# Patient Record
Sex: MALE | Race: BLACK OR AFRICAN AMERICAN | Employment: OTHER | URBAN - METROPOLITAN AREA
[De-identification: names, ages, dates, MRNs, and addresses within clinical notes are randomized per-mention and may not be internally consistent; named-entity substitution may affect disease eponyms.]

---

## 2017-03-27 ENCOUNTER — OFFICE VISIT (OUTPATIENT)
Dept: INTERNAL MEDICINE | Age: 67
End: 2017-03-27

## 2017-03-27 VITALS
RESPIRATION RATE: 18 BRPM | SYSTOLIC BLOOD PRESSURE: 132 MMHG | DIASTOLIC BLOOD PRESSURE: 78 MMHG | HEART RATE: 68 BPM | WEIGHT: 149 LBS | BODY MASS INDEX: 23.51 KG/M2

## 2017-03-27 DIAGNOSIS — J43.1 PANLOBULAR EMPHYSEMA (HCC): Primary | ICD-10-CM

## 2017-03-27 DIAGNOSIS — I10 ESSENTIAL HYPERTENSION, BENIGN: ICD-10-CM

## 2017-03-27 DIAGNOSIS — R06.02 SOB (SHORTNESS OF BREATH): ICD-10-CM

## 2017-03-27 LAB
A/G RATIO: 1.4 (ref 1.1–2.2)
ALBUMIN SERPL-MCNC: 4 G/DL (ref 3.4–5)
ALP BLD-CCNC: 58 U/L (ref 40–129)
ALT SERPL-CCNC: 9 U/L (ref 10–40)
ANION GAP SERPL CALCULATED.3IONS-SCNC: 11 MMOL/L (ref 3–16)
AST SERPL-CCNC: 15 U/L (ref 15–37)
BILIRUB SERPL-MCNC: <0.2 MG/DL (ref 0–1)
BUN BLDV-MCNC: 20 MG/DL (ref 7–20)
CALCIUM SERPL-MCNC: 10 MG/DL (ref 8.3–10.6)
CHLORIDE BLD-SCNC: 102 MMOL/L (ref 99–110)
CO2: 29 MMOL/L (ref 21–32)
CREAT SERPL-MCNC: 1.5 MG/DL (ref 0.8–1.3)
GFR AFRICAN AMERICAN: 56
GFR NON-AFRICAN AMERICAN: 47
GLOBULIN: 2.8 G/DL
GLUCOSE BLD-MCNC: 90 MG/DL (ref 70–99)
HCT VFR BLD CALC: 37.6 % (ref 40.5–52.5)
HEMOGLOBIN: 12.1 G/DL (ref 13.5–17.5)
MCH RBC QN AUTO: 28.5 PG (ref 26–34)
MCHC RBC AUTO-ENTMCNC: 32.3 G/DL (ref 31–36)
MCV RBC AUTO: 88.4 FL (ref 80–100)
PDW BLD-RTO: 15.6 % (ref 12.4–15.4)
PLATELET # BLD: 220 K/UL (ref 135–450)
PMV BLD AUTO: 8.4 FL (ref 5–10.5)
POTASSIUM SERPL-SCNC: 4.7 MMOL/L (ref 3.5–5.1)
RBC # BLD: 4.25 M/UL (ref 4.2–5.9)
SODIUM BLD-SCNC: 142 MMOL/L (ref 136–145)
TOTAL PROTEIN: 6.8 G/DL (ref 6.4–8.2)
WBC # BLD: 3 K/UL (ref 4–11)

## 2017-03-27 PROCEDURE — 99213 OFFICE O/P EST LOW 20 MIN: CPT | Performed by: INTERNAL MEDICINE

## 2017-03-27 PROCEDURE — 36415 COLL VENOUS BLD VENIPUNCTURE: CPT | Performed by: INTERNAL MEDICINE

## 2017-03-27 ASSESSMENT — ENCOUNTER SYMPTOMS
ALLERGIC/IMMUNOLOGIC NEGATIVE: 1
GASTROINTESTINAL NEGATIVE: 1
SHORTNESS OF BREATH: 1
EYES NEGATIVE: 1
COUGH: 0

## 2017-03-27 ASSESSMENT — COPD QUESTIONNAIRES: COPD: 1

## 2017-09-05 ENCOUNTER — TELEPHONE (OUTPATIENT)
Dept: INTERNAL MEDICINE | Age: 67
End: 2017-09-05

## 2017-09-18 ENCOUNTER — OFFICE VISIT (OUTPATIENT)
Dept: INTERNAL MEDICINE | Age: 67
End: 2017-09-18

## 2017-09-18 VITALS
RESPIRATION RATE: 16 BRPM | HEART RATE: 56 BPM | DIASTOLIC BLOOD PRESSURE: 90 MMHG | WEIGHT: 147 LBS | BODY MASS INDEX: 23.2 KG/M2 | SYSTOLIC BLOOD PRESSURE: 140 MMHG

## 2017-09-18 DIAGNOSIS — N18.2 CRI (CHRONIC RENAL INSUFFICIENCY), STAGE 2 (MILD): ICD-10-CM

## 2017-09-18 DIAGNOSIS — J43.1 PANLOBULAR EMPHYSEMA (HCC): Primary | ICD-10-CM

## 2017-09-18 DIAGNOSIS — I10 ESSENTIAL HYPERTENSION, BENIGN: ICD-10-CM

## 2017-09-18 LAB
A/G RATIO: 1.6 (ref 1.1–2.2)
ALBUMIN SERPL-MCNC: 4.2 G/DL (ref 3.4–5)
ALP BLD-CCNC: 45 U/L (ref 40–129)
ALT SERPL-CCNC: 9 U/L (ref 10–40)
ANION GAP SERPL CALCULATED.3IONS-SCNC: 10 MMOL/L (ref 3–16)
AST SERPL-CCNC: 14 U/L (ref 15–37)
BILIRUB SERPL-MCNC: <0.2 MG/DL (ref 0–1)
BUN BLDV-MCNC: 26 MG/DL (ref 7–20)
CALCIUM SERPL-MCNC: 9.5 MG/DL (ref 8.3–10.6)
CHLORIDE BLD-SCNC: 102 MMOL/L (ref 99–110)
CO2: 30 MMOL/L (ref 21–32)
CREAT SERPL-MCNC: 1.6 MG/DL (ref 0.8–1.3)
GFR AFRICAN AMERICAN: 52
GFR NON-AFRICAN AMERICAN: 43
GLOBULIN: 2.6 G/DL
GLUCOSE BLD-MCNC: 95 MG/DL (ref 70–99)
POTASSIUM SERPL-SCNC: 4.1 MMOL/L (ref 3.5–5.1)
SODIUM BLD-SCNC: 142 MMOL/L (ref 136–145)
TOTAL PROTEIN: 6.8 G/DL (ref 6.4–8.2)

## 2017-09-18 PROCEDURE — 36415 COLL VENOUS BLD VENIPUNCTURE: CPT | Performed by: INTERNAL MEDICINE

## 2017-09-18 PROCEDURE — 99214 OFFICE O/P EST MOD 30 MIN: CPT | Performed by: INTERNAL MEDICINE

## 2017-09-18 ASSESSMENT — COPD QUESTIONNAIRES: COPD: 1

## 2017-09-18 ASSESSMENT — ENCOUNTER SYMPTOMS
SHORTNESS OF BREATH: 1
ALLERGIC/IMMUNOLOGIC NEGATIVE: 1
COUGH: 0
GASTROINTESTINAL NEGATIVE: 1
EYES NEGATIVE: 1

## 2017-10-30 ENCOUNTER — OFFICE VISIT (OUTPATIENT)
Dept: INTERNAL MEDICINE | Age: 67
End: 2017-10-30

## 2017-10-30 VITALS
RESPIRATION RATE: 16 BRPM | DIASTOLIC BLOOD PRESSURE: 86 MMHG | HEIGHT: 67 IN | WEIGHT: 144 LBS | SYSTOLIC BLOOD PRESSURE: 126 MMHG | HEART RATE: 80 BPM | BODY MASS INDEX: 22.6 KG/M2

## 2017-10-30 DIAGNOSIS — Z23 NEEDS FLU SHOT: ICD-10-CM

## 2017-10-30 DIAGNOSIS — R06.02 SOB (SHORTNESS OF BREATH): ICD-10-CM

## 2017-10-30 DIAGNOSIS — J43.1 PANLOBULAR EMPHYSEMA (HCC): Primary | ICD-10-CM

## 2017-10-30 DIAGNOSIS — Z87.891 HISTORY OF TOBACCO USE: ICD-10-CM

## 2017-10-30 DIAGNOSIS — I10 ESSENTIAL HYPERTENSION, BENIGN: ICD-10-CM

## 2017-10-30 PROCEDURE — 4040F PNEUMOC VAC/ADMIN/RCVD: CPT | Performed by: INTERNAL MEDICINE

## 2017-10-30 PROCEDURE — G8420 CALC BMI NORM PARAMETERS: HCPCS | Performed by: INTERNAL MEDICINE

## 2017-10-30 PROCEDURE — 99214 OFFICE O/P EST MOD 30 MIN: CPT | Performed by: INTERNAL MEDICINE

## 2017-10-30 PROCEDURE — G0008 ADMIN INFLUENZA VIRUS VAC: HCPCS | Performed by: INTERNAL MEDICINE

## 2017-10-30 PROCEDURE — 1036F TOBACCO NON-USER: CPT | Performed by: INTERNAL MEDICINE

## 2017-10-30 PROCEDURE — G8926 SPIRO NO PERF OR DOC: HCPCS | Performed by: INTERNAL MEDICINE

## 2017-10-30 PROCEDURE — G8484 FLU IMMUNIZE NO ADMIN: HCPCS | Performed by: INTERNAL MEDICINE

## 2017-10-30 PROCEDURE — 3017F COLORECTAL CA SCREEN DOC REV: CPT | Performed by: INTERNAL MEDICINE

## 2017-10-30 PROCEDURE — 90662 IIV NO PRSV INCREASED AG IM: CPT | Performed by: INTERNAL MEDICINE

## 2017-10-30 PROCEDURE — 1123F ACP DISCUSS/DSCN MKR DOCD: CPT | Performed by: INTERNAL MEDICINE

## 2017-10-30 PROCEDURE — 3023F SPIROM DOC REV: CPT | Performed by: INTERNAL MEDICINE

## 2017-10-30 PROCEDURE — G8427 DOCREV CUR MEDS BY ELIG CLIN: HCPCS | Performed by: INTERNAL MEDICINE

## 2017-10-30 NOTE — PROGRESS NOTES
Vaccine Information Sheet, \"Influenza - Inactivated\"  given to Francisco Bertrand, or parent/legal guardian of  Francisco Bertrand and verbalized understanding. Patient responses:    Have you ever had a reaction to a flu vaccine? No  Are you able to eat eggs without adverse effects? Yes  Do you have any current illness? No  Have you ever had Guillian [place] Syndrome? No    Flu vaccine given per order. Please see immunization tab.
Authorizing Provider   fluticasone-salmeterol (ADVAIR DISKUS) 500-50 MCG/DOSE diskus inhaler Inhale 1 puff into the lungs every 12 hours 17  Yes Benjamin Molina MD   fluticasone-salmeterol (ADVAIR DISKUS) 250-50 MCG/DOSE AEPB Inhale 1 puff into the lungs 2 times daily   Yes Historical Provider, MD   tiotropium (SPIRIVA HANDIHALER) 18 MCG inhalation capsule Inhale 1 capsule into the lungs daily 3/27/17  Yes Benjamin Molina MD   NIFEDICAL XL 30 MG extended release tablet TAKE 1 TABLET BY MOUTH DAILY 17  Yes Benjamin Molina MD   tadalafil (CIALIS) 5 MG tablet Take 1 tablet by mouth as needed for Erectile Dysfunction 16  Yes Benjamin Molina MD   Multiple Vitamins-Minerals (THERAPEUTIC MULTIVITAMIN-MINERALS) tablet Take 1 tablet by mouth daily   Yes Historical Provider, MD   albuterol sulfate HFA (VENTOLIN HFA) 108 (90 BASE) MCG/ACT inhaler Inhale 2 puffs into the lungs every 6 hours as needed for Wheezing 16   Benjamin Molina MD       Past Medical History:   Diagnosis Date    Asthma     Hyperlipidemia        Past Surgical History:   Procedure Laterality Date    CARDIAC CATHETERIZATION  2013    Dr. Siena Matute      Right inguinal hernia repair    UPPER GASTROINTESTINAL ENDOSCOPY  2017    Dr. Little Score, haital hernia, mid gastric stricture.         Social History   Substance Use Topics    Smoking status: Former Smoker     Packs/day: 0.50     Years: 45.00     Quit date: 10/21/2015    Smokeless tobacco: Never Used    Alcohol use No       Family History   Problem Relation Age of Onset    Other Mother      80years old-healthy    Other Father       young-heart attack    Other Brother      Healthy 61year old   [facility] Other Sister      61year old-unknown medical history    Other Sister      Healthy 62year old

## 2017-10-30 NOTE — PATIENT INSTRUCTIONS
instructions on the label. Do not give aspirin to anyone younger than 20. It has been linked to Reye syndrome, a serious illness. · If your child is under age 2 or weighs less than 24 pounds, follow your doctor's advice about the amount of medicine to give your child. · Put ice or a cold pack on the sore area for 10 to 20 minutes at a time. Put a thin cloth between the ice and your skin. When should you call for help? Call 911 anytime you think you may need emergency care. For example, call if:  · You or your child has severe problems breathing or swallowing. · You or your child has a seizure. Call your doctor now or seek immediate medical care if:  · You or your child gets hives. · You or your child has a high fever. · You or your child gets a rash. · You or your child has an unusual reaction after the shot. Watch closely for changes in your or your child's health, and be sure to contact your doctor if you have any problems. Where can you learn more? Go to https://[employer]Peer[employer]. org and sign in to your [employer] account. Enter M581 in the [employer] box to learn more about \"Varicella Vaccine: Care Instructions. \"     If you do not have an account, please click on the \"Sign Up Now\" link. Current as of: November 10, 2016  Content Version: 11.3  © 8888-4644 [employer]. Care instructions adapted under license by [facility] ([facility]). If you have questions about a medical condition or this instruction, always ask your healthcare professional. [place] [address] any warranty or liability for your use of this information. Patient Education        Endovascular Aortic Aneurysm Repair: What to Expect at Home  Your Recovery  Endovascular aortic aneurysm repair is a procedure to fix a weak and bulging section of the aorta. The aorta is the large blood vessel (artery) that carries blood from the heart through the belly to the rest of the body.  The doctor put a after your surgery. This is common. Try to avoid constipation and straining with bowel movements. You may want to take a fiber supplement every day. If you have not had a bowel movement after a couple of days, ask your doctor about taking a mild laxative. Medicines  · Your doctor will tell you if and when you can restart your medicines. He or she will also give you instructions about taking any new medicines. · If you take blood thinners, such as warfarin (Coumadin), clopidogrel (Plavix), or aspirin, be sure to talk to your doctor. He or she will tell you if and when to start taking those medicines again. Make sure that you understand exactly what your doctor wants you to do. · Be safe with medicines. Take pain medicines exactly as directed. ¨ If the doctor gave you a prescription medicine for pain, take it as prescribed. ¨ If you are not taking a prescription pain medicine, ask your doctor if you can take an over-the-counter medicine. ¨ Do not take two or more pain medicines at the same time unless the doctor told you to. Many pain medicines have acetaminophen, which is Tylenol. Too much acetaminophen (Tylenol) can be harmful. · If you think your pain medicine is making you sick to your stomach:  ¨ Take your medicine after meals (unless your doctor has told you not to). ¨ Ask your doctor for a different pain medicine. · If your doctor prescribed antibiotics, take them as directed. Do not stop taking them just because you feel better. You need to take the full course of antibiotics. Incision care  · If you have strips of tape on the incisions, leave the tape on for a week or until it falls off. · Wash the area daily with water and pat it dry. Other cleaning products, such as hydrogen peroxide, can make the wounds heal more slowly. You may cover the area with a gauze bandage if it weeps or rubs against clothing. Change the bandage every day. · Keep the area clean and dry.   Follow-up care is a key part of

## 2017-12-06 ENCOUNTER — TELEPHONE (OUTPATIENT)
Dept: INTERNAL MEDICINE CLINIC | Age: 67
End: 2017-12-06

## 2017-12-06 RX ORDER — NIFEDIPINE 30 MG/1
TABLET, EXTENDED RELEASE ORAL
Qty: 90 TABLET | Refills: 3 | Status: SHIPPED | OUTPATIENT
Start: 2017-12-06 | End: 2018-08-20 | Stop reason: SDUPTHER

## 2017-12-06 NOTE — TELEPHONE ENCOUNTER
Pt needs refill of NIFEDICAL XL 30 MG extended release tablet [[id number]      [facility] [address] [place] [address], [address] [place] [address] - F [phone]

## 2018-05-14 ENCOUNTER — OFFICE VISIT (OUTPATIENT)
Dept: INTERNAL MEDICINE CLINIC | Age: 68
End: 2018-05-14

## 2018-05-14 VITALS
BODY MASS INDEX: 22.5 KG/M2 | OXYGEN SATURATION: 92 % | WEIGHT: 142.6 LBS | DIASTOLIC BLOOD PRESSURE: 97 MMHG | SYSTOLIC BLOOD PRESSURE: 151 MMHG | HEART RATE: 75 BPM

## 2018-05-14 DIAGNOSIS — J43.1 PANLOBULAR EMPHYSEMA (HCC): ICD-10-CM

## 2018-05-14 DIAGNOSIS — I10 ESSENTIAL HYPERTENSION, BENIGN: Primary | ICD-10-CM

## 2018-05-14 DIAGNOSIS — N18.30 STAGE 3 CHRONIC KIDNEY DISEASE (HCC): ICD-10-CM

## 2018-05-14 LAB
CREATININE URINE: 169.7 MG/DL (ref 39–259)
MICROALBUMIN UR-MCNC: <1.2 MG/DL
MICROALBUMIN/CREAT UR-RTO: NORMAL MG/G (ref 0–30)

## 2018-05-14 PROCEDURE — G8510 SCR DEP NEG, NO PLAN REQD: HCPCS | Performed by: INTERNAL MEDICINE

## 2018-05-14 PROCEDURE — G8428 CUR MEDS NOT DOCUMENT: HCPCS | Performed by: INTERNAL MEDICINE

## 2018-05-14 PROCEDURE — G8926 SPIRO NO PERF OR DOC: HCPCS | Performed by: INTERNAL MEDICINE

## 2018-05-14 PROCEDURE — G8420 CALC BMI NORM PARAMETERS: HCPCS | Performed by: INTERNAL MEDICINE

## 2018-05-14 PROCEDURE — 4040F PNEUMOC VAC/ADMIN/RCVD: CPT | Performed by: INTERNAL MEDICINE

## 2018-05-14 PROCEDURE — 99214 OFFICE O/P EST MOD 30 MIN: CPT | Performed by: INTERNAL MEDICINE

## 2018-05-14 PROCEDURE — 1036F TOBACCO NON-USER: CPT | Performed by: INTERNAL MEDICINE

## 2018-05-14 PROCEDURE — 3023F SPIROM DOC REV: CPT | Performed by: INTERNAL MEDICINE

## 2018-05-14 PROCEDURE — 3017F COLORECTAL CA SCREEN DOC REV: CPT | Performed by: INTERNAL MEDICINE

## 2018-05-14 PROCEDURE — 1123F ACP DISCUSS/DSCN MKR DOCD: CPT | Performed by: INTERNAL MEDICINE

## 2018-05-14 PROCEDURE — 3288F FALL RISK ASSESSMENT DOCD: CPT | Performed by: INTERNAL MEDICINE

## 2018-05-14 RX ORDER — AMLODIPINE BESYLATE 5 MG/1
5 TABLET ORAL DAILY
Qty: 30 TABLET | Refills: 2 | Status: SHIPPED | OUTPATIENT
Start: 2018-05-14 | End: 2018-05-14 | Stop reason: SDUPTHER

## 2018-05-14 RX ORDER — SOFT LENS DISINFECTANT
1 SOLUTION, NON-ORAL MISCELLANEOUS 4 TIMES DAILY PRN
Qty: 1 DEVICE | Refills: 0 | Status: SHIPPED | OUTPATIENT
Start: 2018-05-14

## 2018-05-14 RX ORDER — IPRATROPIUM BROMIDE AND ALBUTEROL SULFATE 2.5; .5 MG/3ML; MG/3ML
1 SOLUTION RESPIRATORY (INHALATION) EVERY 6 HOURS PRN
Qty: 360 ML | Refills: 1 | Status: SHIPPED | OUTPATIENT
Start: 2018-05-14 | End: 2019-06-17 | Stop reason: ALTCHOICE

## 2018-05-14 RX ORDER — BUDESONIDE AND FORMOTEROL FUMARATE DIHYDRATE 160; 4.5 UG/1; UG/1
2 AEROSOL RESPIRATORY (INHALATION) 2 TIMES DAILY
COMMUNITY
End: 2019-06-17 | Stop reason: SDUPTHER

## 2018-05-14 RX ORDER — BUDESONIDE AND FORMOTEROL FUMARATE DIHYDRATE 160; 4.5 UG/1; UG/1
2 AEROSOL RESPIRATORY (INHALATION) 2 TIMES DAILY
Qty: 1 INHALER | Refills: 3 | Status: SHIPPED | OUTPATIENT
Start: 2018-05-14 | End: 2018-08-20 | Stop reason: SDUPTHER

## 2018-05-14 RX ORDER — IPRATROPIUM BROMIDE AND ALBUTEROL SULFATE 2.5; .5 MG/3ML; MG/3ML
1 SOLUTION RESPIRATORY (INHALATION) EVERY 6 HOURS PRN
Qty: 360 ML | Refills: 1 | Status: SHIPPED | OUTPATIENT
Start: 2018-05-14 | End: 2018-05-14 | Stop reason: SDUPTHER

## 2018-05-14 RX ORDER — AMLODIPINE BESYLATE 5 MG/1
5 TABLET ORAL DAILY
Qty: 30 TABLET | Refills: 2 | Status: SHIPPED | OUTPATIENT
Start: 2018-05-14 | End: 2018-08-20 | Stop reason: ALTCHOICE

## 2018-05-14 ASSESSMENT — PATIENT HEALTH QUESTIONNAIRE - PHQ9
SUM OF ALL RESPONSES TO PHQ QUESTIONS 1-9: 0
2. FEELING DOWN, DEPRESSED OR HOPELESS: 0
1. LITTLE INTEREST OR PLEASURE IN DOING THINGS: 0
SUM OF ALL RESPONSES TO PHQ9 QUESTIONS 1 & 2: 0

## 2018-08-14 ENCOUNTER — TELEPHONE (OUTPATIENT)
Dept: ORTHOPEDIC SURGERY | Age: 68
End: 2018-08-14

## 2018-08-20 ENCOUNTER — OFFICE VISIT (OUTPATIENT)
Dept: INTERNAL MEDICINE CLINIC | Age: 68
End: 2018-08-20

## 2018-08-20 VITALS
HEART RATE: 68 BPM | WEIGHT: 141 LBS | SYSTOLIC BLOOD PRESSURE: 158 MMHG | DIASTOLIC BLOOD PRESSURE: 98 MMHG | BODY MASS INDEX: 22.25 KG/M2

## 2018-08-20 DIAGNOSIS — J43.1 PANLOBULAR EMPHYSEMA (HCC): Primary | ICD-10-CM

## 2018-08-20 DIAGNOSIS — I10 ESSENTIAL HYPERTENSION, BENIGN: ICD-10-CM

## 2018-08-20 PROCEDURE — G8420 CALC BMI NORM PARAMETERS: HCPCS | Performed by: INTERNAL MEDICINE

## 2018-08-20 PROCEDURE — 1101F PT FALLS ASSESS-DOCD LE1/YR: CPT | Performed by: INTERNAL MEDICINE

## 2018-08-20 PROCEDURE — 3017F COLORECTAL CA SCREEN DOC REV: CPT | Performed by: INTERNAL MEDICINE

## 2018-08-20 PROCEDURE — G8427 DOCREV CUR MEDS BY ELIG CLIN: HCPCS | Performed by: INTERNAL MEDICINE

## 2018-08-20 PROCEDURE — 3023F SPIROM DOC REV: CPT | Performed by: INTERNAL MEDICINE

## 2018-08-20 PROCEDURE — 4040F PNEUMOC VAC/ADMIN/RCVD: CPT | Performed by: INTERNAL MEDICINE

## 2018-08-20 PROCEDURE — 1036F TOBACCO NON-USER: CPT | Performed by: INTERNAL MEDICINE

## 2018-08-20 PROCEDURE — 1123F ACP DISCUSS/DSCN MKR DOCD: CPT | Performed by: INTERNAL MEDICINE

## 2018-08-20 PROCEDURE — 99213 OFFICE O/P EST LOW 20 MIN: CPT | Performed by: INTERNAL MEDICINE

## 2018-08-20 PROCEDURE — G8926 SPIRO NO PERF OR DOC: HCPCS | Performed by: INTERNAL MEDICINE

## 2018-08-20 RX ORDER — SOFT LENS DISINFECTANT
1 SOLUTION, NON-ORAL MISCELLANEOUS 4 TIMES DAILY PRN
Qty: 1 DEVICE | Refills: 0 | Status: SHIPPED | OUTPATIENT
Start: 2018-08-20 | End: 2018-09-04 | Stop reason: SDUPTHER

## 2018-08-20 RX ORDER — NIFEDIPINE 60 MG/1
60 TABLET, EXTENDED RELEASE ORAL DAILY
Qty: 30 TABLET | Refills: 2 | Status: SHIPPED | OUTPATIENT
Start: 2018-08-20 | End: 2019-02-18 | Stop reason: SDUPTHER

## 2018-08-20 RX ORDER — DOXAZOSIN 2 MG/1
2 TABLET ORAL NIGHTLY
Qty: 30 TABLET | Refills: 2 | Status: SHIPPED | OUTPATIENT
Start: 2018-08-20 | End: 2018-11-26 | Stop reason: SDUPTHER

## 2018-08-20 NOTE — PATIENT INSTRUCTIONS
Patient Education        Chronic Obstructive Pulmonary Disease (COPD): Care Instructions  Your Care Instructions    Chronic obstructive pulmonary disease (COPD) is a general term for a group of lung diseases, including emphysema and chronic bronchitis. People with COPD have decreased airflow in and out of the lungs, which makes it hard to breathe. The airways also can get clogged with thick mucus. Cigarette smoking is a major cause of COPD. Although there is no cure for COPD, you can slow its progress. Following your treatment plan and taking care of yourself can help you feel better and live longer. Follow-up care is a key part of your treatment and safety. Be sure to make and go to all appointments, and call your doctor if you are having problems. It's also a good idea to know your test results and keep a list of the medicines you take. How can you care for yourself at home?   Staying healthy    · Do not smoke. This is the most important step you can take to prevent more damage to your lungs. If you need help quitting, talk to your doctor about stop-smoking programs and medicines. These can increase your chances of quitting for good.     · Avoid colds and flu. Get a pneumococcal vaccine shot. If you have had one before, ask your doctor whether you need a second dose. Get the flu vaccine every fall. If you must be around people with colds or the flu, wash your hands often.     · Avoid secondhand smoke, air pollution, and high altitudes. Also avoid cold, dry air and hot, humid air. Stay at home with your windows closed when air pollution is bad.    Medicines and oxygen therapy    · Take your medicines exactly as prescribed. Call your doctor if you think you are having a problem with your medicine.     · You may be taking medicines such as:  ¨ Bronchodilators. These help open your airways and make breathing easier.  Bronchodilators are either short-acting (work for 6 to 9 hours) or long-acting (work for 25 hours). You inhale most bronchodilators, so they start to act quickly. Always carry your quick-relief inhaler with you in case you need it while you are away from home. ¨ Corticosteroids (prednisone, budesonide). These reduce airway inflammation. They come in pill or inhaled form. You must take these medicines every day for them to work well.     · A spacer may help you get more inhaled medicine to your lungs. Ask your doctor or pharmacist if a spacer is right for you. If it is, ask how to use it properly.     · Do not take any vitamins, over-the-counter medicine, or herbal products without talking to your doctor first.     · If your doctor prescribed antibiotics, take them as directed. Do not stop taking them just because you feel better. You need to take the full course of antibiotics.     · Oxygen therapy boosts the amount of oxygen in your blood and helps you breathe easier. Use the flow rate your doctor has recommended, and do not change it without talking to your doctor first.   Activity    · Get regular exercise. Walking is an easy way to get exercise. Start out slowly, and walk a little more each day.     · Pay attention to your breathing. You are exercising too hard if you cannot talk while you are exercising.     · Take short rest breaks when doing household chores and other activities.     · Learn breathing methods-such as breathing through pursed lips-to help you become less short of breath.     · If your doctor has not set you up with a pulmonary rehabilitation program, talk to him or her about whether rehab is right for you. Rehab includes exercise programs, education about your disease and how to manage it, help with diet and other changes, and emotional support. Diet    · Eat regular, healthy meals. Use bronchodilators about 1 hour before you eat to make it easier to eat. Eat several small meals instead of three large ones. Drink beverages at the end of the meal. Avoid foods that are hard to chew. each day. A serving is 3 ounces, about the size of a deck of cards. · Eat 4 to 5 servings of nuts, seeds, and legumes (cooked dried beans, lentils, and split peas) each week. A serving is 1/3 cup of nuts, 2 tablespoons of seeds, or ½ cup of cooked beans or peas. · Limit fats and oils to 2 to 3 servings each day. A serving is 1 teaspoon of vegetable oil or 2 tablespoons of salad dressing. · Limit sweets and added sugars to 5 servings or less a week. A serving is 1 tablespoon jelly or jam, ½ cup sorbet, or 1 cup of lemonade. · Eat less than 2,300 milligrams (mg) of sodium a day. If you limit your sodium to 1,500 mg a day, you can lower your blood pressure even more. Tips for success  · Start small. Do not try to make dramatic changes to your diet all at once. You might feel that you are missing out on your favorite foods and then be more likely to not follow the plan. Make small changes, and stick with them. Once those changes become habit, add a few more changes. · Try some of the following:  ¨ Make it a goal to eat a fruit or vegetable at every meal and at snacks. This will make it easy to get the recommended amount of fruits and vegetables each day. ¨ Try yogurt topped with fruit and nuts for a snack or healthy dessert. ¨ Add lettuce, tomato, cucumber, and onion to sandwiches. ¨ Combine a ready-made pizza crust with low-fat mozzarella cheese and lots of vegetable toppings. Try using tomatoes, squash, spinach, broccoli, carrots, cauliflower, and onions. ¨ Have a variety of cut-up vegetables with a low-fat dip as an appetizer instead of chips and dip. ¨ Sprinkle sunflower seeds or chopped almonds over salads. Or try adding chopped walnuts or almonds to cooked vegetables. ¨ Try some vegetarian meals using beans and peas. Add garbanzo or kidney beans to salads. Make burritos and tacos with mashed gagnon beans or black beans. Where can you learn more? Go to https://alyssa.[facility]. org and sign

## 2018-08-20 NOTE — PROGRESS NOTES
62.5-25 MCG/INH AEPB inhaler Inhale 1 puff into the lungs daily 18  Yes Rosetta Schilder, DO   Respiratory Therapy Supplies (NEBULIZER) LILIANE 1 applicator by Does not apply route 4 times daily as needed (SOB/wheezing) 18  Yes Rosetta Schilder, DO   ipratropium-albuterol (DUONEB) 0.5-2.5 (3) MG/3ML SOLN nebulizer solution Inhale 3 mLs into the lungs every 6 hours as needed for Shortness of Breath 18  Yes Rosetta Schilder, DO   amLODIPine (NORVASC) 5 MG tablet Take 1 tablet by mouth daily 18  Yes Rosetta Schilder, DO   tadalafil (CIALIS) 5 MG tablet Take 1 tablet by mouth as needed for Erectile Dysfunction 16  Yes Anya Clemons MD   albuterol sulfate HFA (VENTOLIN HFA) 108 (90 BASE) MCG/ACT inhaler Inhale 2 puffs into the lungs every 6 hours as needed for Wheezing 16  Yes Anya Clemons MD   Multiple Vitamins-Minerals (THERAPEUTIC MULTIVITAMIN-MINERALS) tablet Take 1 tablet by mouth daily   Yes Historical Provider, MD   NIFEdipine (NIFEDICAL XL) 30 MG extended release tablet TAKE 1 TABLET BY MOUTH DAILY  Patient taking differently: 60 mg TAKE 1 TABLET BY MOUTH DAILY 17   Rosetta Schilder, DO       Past Medical History:   Diagnosis Date    Asthma     Hyperlipidemia        Past Surgical History:   Procedure Laterality Date    CARDIAC CATHETERIZATION  2013    Dr. Hunter Arellano      Right inguinal hernia repair    UPPER GASTROINTESTINAL ENDOSCOPY  2017    Dr. Margaret Reid, haital hernia, mid gastric stricture.         Social History   Substance Use Topics    Smoking status: Former Smoker     Packs/day: 0.50     Years: 45.00     Quit date: 10/21/2015    Smokeless tobacco: Never Used    Alcohol use No       Family History   Problem Relation Age of Onset    Other Mother         80years old-healthy    Other Father          young-heart attack    Other Brother         Healthy 61year old   Marjorie Dong Other Sister         61year old-unknown medical history    Other Sister

## 2018-08-24 ENCOUNTER — TELEPHONE (OUTPATIENT)
Dept: INTERNAL MEDICINE CLINIC | Age: 68
End: 2018-08-24

## 2018-08-24 NOTE — TELEPHONE ENCOUNTER
Roxanna with [facility] office called to speak to Dr. Birgit Elizabeth or his MA regarding a referral they received. Please call Roxanna at number provided. Thanks.

## 2018-08-28 ENCOUNTER — TELEPHONE (OUTPATIENT)
Dept: INTERNAL MEDICINE CLINIC | Age: 68
End: 2018-08-28

## 2018-09-04 ENCOUNTER — TELEPHONE (OUTPATIENT)
Dept: INTERNAL MEDICINE CLINIC | Age: 68
End: 2018-09-04

## 2018-09-04 RX ORDER — SOFT LENS DISINFECTANT
1 SOLUTION, NON-ORAL MISCELLANEOUS 4 TIMES DAILY PRN
Qty: 1 DEVICE | Refills: 0 | Status: SHIPPED | OUTPATIENT
Start: 2018-09-04 | End: 2019-06-17 | Stop reason: ALTCHOICE

## 2018-09-04 NOTE — TELEPHONE ENCOUNTER
Patient states that he lost the prescription for the nebulizer. Patient requesting to  another script or have it called into [facility]. Please advise. Patient can be reached at phone number provided.      [facility] [address] [phone]

## 2018-10-02 ENCOUNTER — TELEPHONE (OUTPATIENT)
Dept: INTERNAL MEDICINE CLINIC | Age: 68
End: 2018-10-02

## 2018-10-02 RX ORDER — ALBUTEROL SULFATE 90 UG/1
2 AEROSOL, METERED RESPIRATORY (INHALATION) EVERY 6 HOURS PRN
Qty: 1 INHALER | Refills: 5 | Status: SHIPPED | OUTPATIENT
Start: 2018-10-02 | End: 2019-06-17 | Stop reason: SDUPTHER

## 2018-10-15 ENCOUNTER — TELEPHONE (OUTPATIENT)
Dept: INTERNAL MEDICINE CLINIC | Age: 68
End: 2018-10-15

## 2018-10-15 NOTE — TELEPHONE ENCOUNTER
Roxanna stated that they received a referral for this pt. And they have scheduled him 7 times . He has cancelled 5 times and no showed 2 times  She stated that she not sure if pt wants to be seen. She wants to know what they should do? Please advise.

## 2018-11-26 ENCOUNTER — OFFICE VISIT (OUTPATIENT)
Dept: INTERNAL MEDICINE CLINIC | Age: 68
End: 2018-11-26
Payer: MEDICARE

## 2018-11-26 VITALS
DIASTOLIC BLOOD PRESSURE: 94 MMHG | WEIGHT: 140 LBS | HEART RATE: 76 BPM | OXYGEN SATURATION: 92 % | TEMPERATURE: 98.5 F | RESPIRATION RATE: 16 BRPM | SYSTOLIC BLOOD PRESSURE: 144 MMHG | BODY MASS INDEX: 22.09 KG/M2

## 2018-11-26 DIAGNOSIS — I10 ESSENTIAL HYPERTENSION, BENIGN: Primary | ICD-10-CM

## 2018-11-26 DIAGNOSIS — J43.1 PANLOBULAR EMPHYSEMA (HCC): ICD-10-CM

## 2018-11-26 DIAGNOSIS — Z12.11 SCREENING FOR COLON CANCER: ICD-10-CM

## 2018-11-26 DIAGNOSIS — E78.00 HYPERCHOLESTEROLEMIA: ICD-10-CM

## 2018-11-26 DIAGNOSIS — Z72.89 OTHER PROBLEMS RELATED TO LIFESTYLE: ICD-10-CM

## 2018-11-26 DIAGNOSIS — R06.02 SOB (SHORTNESS OF BREATH): ICD-10-CM

## 2018-11-26 PROCEDURE — G8427 DOCREV CUR MEDS BY ELIG CLIN: HCPCS | Performed by: INTERNAL MEDICINE

## 2018-11-26 PROCEDURE — 4040F PNEUMOC VAC/ADMIN/RCVD: CPT | Performed by: INTERNAL MEDICINE

## 2018-11-26 PROCEDURE — 1101F PT FALLS ASSESS-DOCD LE1/YR: CPT | Performed by: INTERNAL MEDICINE

## 2018-11-26 PROCEDURE — 90662 IIV NO PRSV INCREASED AG IM: CPT | Performed by: INTERNAL MEDICINE

## 2018-11-26 PROCEDURE — 99214 OFFICE O/P EST MOD 30 MIN: CPT | Performed by: INTERNAL MEDICINE

## 2018-11-26 PROCEDURE — 3023F SPIROM DOC REV: CPT | Performed by: INTERNAL MEDICINE

## 2018-11-26 PROCEDURE — 1123F ACP DISCUSS/DSCN MKR DOCD: CPT | Performed by: INTERNAL MEDICINE

## 2018-11-26 PROCEDURE — G8926 SPIRO NO PERF OR DOC: HCPCS | Performed by: INTERNAL MEDICINE

## 2018-11-26 PROCEDURE — G8420 CALC BMI NORM PARAMETERS: HCPCS | Performed by: INTERNAL MEDICINE

## 2018-11-26 PROCEDURE — 1036F TOBACCO NON-USER: CPT | Performed by: INTERNAL MEDICINE

## 2018-11-26 PROCEDURE — G8482 FLU IMMUNIZE ORDER/ADMIN: HCPCS | Performed by: INTERNAL MEDICINE

## 2018-11-26 PROCEDURE — 3017F COLORECTAL CA SCREEN DOC REV: CPT | Performed by: INTERNAL MEDICINE

## 2018-11-26 PROCEDURE — G0008 ADMIN INFLUENZA VIRUS VAC: HCPCS | Performed by: INTERNAL MEDICINE

## 2018-11-26 RX ORDER — DOXAZOSIN 2 MG/1
2 TABLET ORAL NIGHTLY
Qty: 30 TABLET | Refills: 2 | Status: SHIPPED | OUTPATIENT
Start: 2018-11-26 | End: 2018-11-26 | Stop reason: SDUPTHER

## 2018-11-26 RX ORDER — GUAIFENESIN 600 MG/1
1200 TABLET, EXTENDED RELEASE ORAL 2 TIMES DAILY
Qty: 60 TABLET | Refills: 0 | Status: SHIPPED | OUTPATIENT
Start: 2018-11-26 | End: 2019-03-19

## 2018-11-26 NOTE — PATIENT INSTRUCTIONS
hours). You inhale most bronchodilators, so they start to act quickly. Always carry your quick-relief inhaler with you in case you need it while you are away from home. ? Corticosteroids (prednisone, budesonide). These reduce airway inflammation. They come in pill or inhaled form. You must take these medicines every day for them to work well.     · A spacer may help you get more inhaled medicine to your lungs. Ask your doctor or pharmacist if a spacer is right for you. If it is, ask how to use it properly.     · Do not take any vitamins, over-the-counter medicine, or herbal products without talking to your doctor first.     · If your doctor prescribed antibiotics, take them as directed. Do not stop taking them just because you feel better. You need to take the full course of antibiotics.     · Oxygen therapy boosts the amount of oxygen in your blood and helps you breathe easier. Use the flow rate your doctor has recommended, and do not change it without talking to your doctor first.   Activity    · Get regular exercise. Walking is an easy way to get exercise. Start out slowly, and walk a little more each day.     · Pay attention to your breathing. You are exercising too hard if you cannot talk while you are exercising.     · Take short rest breaks when doing household chores and other activities.     · Learn breathing methods--such as breathing through pursed lips--to help you become less short of breath.     · If your doctor has not set you up with a pulmonary rehabilitation program, talk to him or her about whether rehab is right for you. Rehab includes exercise programs, education about your disease and how to manage it, help with diet and other changes, and emotional support. Diet    · Eat regular, healthy meals. Use bronchodilators about 1 hour before you eat to make it easier to eat. Eat several small meals instead of three large ones.  Drink beverages at the end of the meal. Avoid foods that are hard to

## 2018-11-26 NOTE — PROGRESS NOTES
PROGRESS NOTE:    Dorian Donaldson    11/28/2018    Chief Complaint   Patient presents with    Follow-up     Exercises on stationaary bike three to four times weekly. HPI:    Mr(s)Regino Donaldson presents to clinic today with issues noted above. States he had a nice Thanksgiving, had his family over and he cooked. No new issues medically. Still have much exertional dyspnea with hx of COPD. He tired a nebulizer tx, thinks may be because he had phlegm. States Anoro is very expensive. Patient is taking BP medications at home without side effects, BP is not being checked at home. Patient denies chest pain, SOB, NVD, FC, rash, malaise, rigor, dizziness/lightheadness, other pertinent ROS was also reviewed. BP (!) 144/94 (Site: Right Upper Arm, Position: Sitting, Cuff Size: Medium Adult)   Pulse 76 Comment: Regular  Temp 98.5 °F (36.9 °C) (Oral)   Resp 16   Wt 140 lb (63.5 kg)   SpO2 92% Comment: Room Air  BMI 22.09 kg/m²   Body mass index is 22.09 kg/m². Allergies   Allergen Reactions    Codeine Hives     Physical Exam:    Gen: Patient appears well groomed, frail appearing  HEAD: Atraumatic, normocephalic,   Eyes: PERRLA, EOMI   Neck: supple, no thyroid nodule appreciated, no JVD  Chest: Clear to auscultation CRISTINO, every extended and poor air movement, unlabored breathing, normal expansion, barrel chest  Heart: Regular rate, regular rhythm, no murmur, no rub, distant quality  Abdomen: Non-tender, non-distended, bowel sounds present x3  Extremities: no edema, distal pulses intact  Patient was alert and oriented to person, place and time    Assessment and Plan:    Lindsay Ren was seen today for follow-up. Diagnoses and all orders for this visit:    Essential hypertension, benign  Controlled overall, continue meds, follow renal function   -     COMPREHENSIVE METABOLIC PANEL; Future  -     LDL CHOLESTEROL, DIRECT;  Future    Panlobular emphysema ([address]  On exam he did not have any conversational dyspnea, has episodes of mucous plugging possibly with hx he provided when using nebulizer, added daily mucinex, daily ICS and prn nebulizer and also Anoro samples given today    SOB (shortness of breath)  As above    Hypercholesterolemia  Patient is tolerating anti-lipid meds such as statin without complications, no myalgia.     -     COMPREHENSIVE METABOLIC PANEL; Future  -     HDL CHOLESTEROL; Future  -     LDL CHOLESTEROL, DIRECT; Future    Screening for colon cancer  FIT test due, not a good candidate for anesthesia for cscope due to respiratory disease    Other orders  -     INFLUENZA, HIGH DOSE, 65 YRS +, IM, PF, PREFILL SYR, 0.5ML (FLUZONE HD)  -     guaiFENesin (MUCINEX) 600 MG extended release tablet; Take 2 tablets by mouth 2 times daily  -     Discontinue: doxazosin (CARDURA) 2 MG tablet; Take 1 tablet by mouth nightly  -     Cancel: POCT FECAL IMMUNOCHEMICAL TEST (FIT); Future  -     Cancel: Hepatitis C Antibody; Future    Preventive medicine: patient has had indicated immunizations. Fluzone today, FIT test due. Orders Placed This Encounter   Procedures    INFLUENZA, HIGH DOSE, 65 YRS +, IM, PF, PREFILL SYR, 0.5ML (FLUZONE HD)    COMPREHENSIVE METABOLIC PANEL    HDL CHOLESTEROL    LDL CHOLESTEROL, DIRECT       Prior to Admission medications    Medication Sig Start Date End Date Taking?  Authorizing Provider   guaiFENesin (MUCINEX) 600 MG extended release tablet Take 2 tablets by mouth 2 times daily 11/26/18  Yes Kaylie Pham, DO   albuterol sulfate HFA (VENTOLIN HFA) 108 (90 Base) MCG/ACT inhaler Inhale 2 puffs into the lungs every 6 hours as needed for Wheezing 10/2/18  Yes Kaylie Pham DO   Respiratory Therapy Supplies (NEBULIZER) LILIANE Inhale 1 each into the lungs 4 times daily as needed (SOB) 9/4/18  Yes Kaylie Pham, DO   NIFEdipine (NIFEDICAL XL) 60 MG extended release tablet Take 1 tablet by mouth daily 8/20/18  Yes Kaylie Pham, DO   budesonide-formoterol (SYMBICORT) 160-4.5 MCG/ACT AERO Inhale 2 puffs into the lungs 2 times daily   Yes Historical Provider, MD   umeclidinium-vilanterol (ANORO ELLIPTA) 62.5-25 MCG/INH AEPB inhaler Inhale 1 puff into the lungs daily 18  Yes Kaylie Pham, DO   Respiratory Therapy Supplies (NEBULIZER) LILIANE 1 applicator by Does not apply route 4 times daily as needed (SOB/wheezing) 18  Yes Kaylie Pham, DO   ipratropium-albuterol (DUONEB) 0.5-2.5 (3) MG/3ML SOLN nebulizer solution Inhale 3 mLs into the lungs every 6 hours as needed for Shortness of Breath 18  Yes Kaylie Pham, DO   tadalafil (CIALIS) 5 MG tablet Take 1 tablet by mouth as needed for Erectile Dysfunction 16  Yes Jeannie Lester MD   Multiple Vitamins-Minerals (THERAPEUTIC MULTIVITAMIN-MINERALS) tablet Take 1 tablet by mouth daily   Yes Historical Provider, MD   doxazosin (CARDURA) 2 MG tablet TAKE 1 TABLET BY MOUTH EVERY NIGHT 18   Kaylie Pham, DO       Past Medical History:   Diagnosis Date    Asthma     Hyperlipidemia        Past Surgical History:   Procedure Laterality Date    CARDIAC CATHETERIZATION  2013    Dr. Maryam Amaro      Right inguinal hernia repair    UPPER GASTROINTESTINAL ENDOSCOPY  2017    Dr. Emerson Charles, haital hernia, mid gastric stricture.         Social History   Substance Use Topics    Smoking status: Former Smoker     Packs/day: 0.50     Years: 45.00     Quit date: 10/21/2015    Smokeless tobacco: Never Used    Alcohol use No       Family History   Problem Relation Age of Onset    Other Mother         80years old-healthy    Other Father          young-heart attack    Other Brother         Healthy 61year old   [employer] Other Sister         61year old-unknown medical history    Other Sister         Healthy 62year old

## 2019-01-14 ENCOUNTER — TELEPHONE (OUTPATIENT)
Dept: INTERNAL MEDICINE CLINIC | Age: 69
End: 2019-01-14

## 2019-02-19 RX ORDER — NIFEDIPINE 60 MG/1
60 TABLET, EXTENDED RELEASE ORAL DAILY
Qty: 30 TABLET | Refills: 5 | Status: SHIPPED | OUTPATIENT
Start: 2019-02-19 | End: 2019-02-19 | Stop reason: SDUPTHER

## 2019-02-22 RX ORDER — NIFEDIPINE 60 MG/1
60 TABLET, EXTENDED RELEASE ORAL DAILY
Qty: 90 TABLET | Refills: 1 | Status: SHIPPED | OUTPATIENT
Start: 2019-02-22 | End: 2019-03-19 | Stop reason: SDUPTHER

## 2019-03-19 ENCOUNTER — OFFICE VISIT (OUTPATIENT)
Dept: INTERNAL MEDICINE CLINIC | Age: 69
End: 2019-03-19
Payer: MEDICARE

## 2019-03-19 VITALS
DIASTOLIC BLOOD PRESSURE: 92 MMHG | TEMPERATURE: 97.4 F | BODY MASS INDEX: 23.51 KG/M2 | SYSTOLIC BLOOD PRESSURE: 140 MMHG | HEART RATE: 68 BPM | OXYGEN SATURATION: 96 % | WEIGHT: 149 LBS

## 2019-03-19 DIAGNOSIS — Z12.11 COLON CANCER SCREENING: ICD-10-CM

## 2019-03-19 DIAGNOSIS — R06.02 SOB (SHORTNESS OF BREATH): ICD-10-CM

## 2019-03-19 DIAGNOSIS — I10 ESSENTIAL HYPERTENSION, BENIGN: Primary | ICD-10-CM

## 2019-03-19 DIAGNOSIS — N18.30 STAGE 3 CHRONIC KIDNEY DISEASE (HCC): ICD-10-CM

## 2019-03-19 DIAGNOSIS — N18.30 CHRONIC KIDNEY DISEASE, STAGE III (MODERATE) (HCC): ICD-10-CM

## 2019-03-19 DIAGNOSIS — J43.1 PANLOBULAR EMPHYSEMA (HCC): ICD-10-CM

## 2019-03-19 LAB
CONTROL: NEGATIVE
HEMOCCULT STL QL: NEGATIVE

## 2019-03-19 PROCEDURE — G8482 FLU IMMUNIZE ORDER/ADMIN: HCPCS | Performed by: INTERNAL MEDICINE

## 2019-03-19 PROCEDURE — G8926 SPIRO NO PERF OR DOC: HCPCS | Performed by: INTERNAL MEDICINE

## 2019-03-19 PROCEDURE — 4040F PNEUMOC VAC/ADMIN/RCVD: CPT | Performed by: INTERNAL MEDICINE

## 2019-03-19 PROCEDURE — 1036F TOBACCO NON-USER: CPT | Performed by: INTERNAL MEDICINE

## 2019-03-19 PROCEDURE — G8428 CUR MEDS NOT DOCUMENT: HCPCS | Performed by: INTERNAL MEDICINE

## 2019-03-19 PROCEDURE — G8420 CALC BMI NORM PARAMETERS: HCPCS | Performed by: INTERNAL MEDICINE

## 2019-03-19 PROCEDURE — 1123F ACP DISCUSS/DSCN MKR DOCD: CPT | Performed by: INTERNAL MEDICINE

## 2019-03-19 PROCEDURE — 3017F COLORECTAL CA SCREEN DOC REV: CPT | Performed by: INTERNAL MEDICINE

## 2019-03-19 PROCEDURE — 3023F SPIROM DOC REV: CPT | Performed by: INTERNAL MEDICINE

## 2019-03-19 PROCEDURE — 1101F PT FALLS ASSESS-DOCD LE1/YR: CPT | Performed by: INTERNAL MEDICINE

## 2019-03-19 PROCEDURE — 99214 OFFICE O/P EST MOD 30 MIN: CPT | Performed by: INTERNAL MEDICINE

## 2019-03-19 PROCEDURE — 82274 ASSAY TEST FOR BLOOD FECAL: CPT | Performed by: INTERNAL MEDICINE

## 2019-03-19 RX ORDER — GUAIFENESIN 600 MG/1
1200 TABLET, EXTENDED RELEASE ORAL DAILY
Qty: 30 TABLET | Refills: 5 | Status: SHIPPED | OUTPATIENT
Start: 2019-03-19 | End: 2019-06-17 | Stop reason: SDUPTHER

## 2019-03-19 RX ORDER — DOXAZOSIN MESYLATE 4 MG/1
4 TABLET ORAL NIGHTLY
Qty: 30 TABLET | Refills: 5 | Status: SHIPPED | OUTPATIENT
Start: 2019-03-19 | End: 2019-03-19 | Stop reason: SDUPTHER

## 2019-03-19 RX ORDER — NIFEDIPINE 60 MG/1
60 TABLET, EXTENDED RELEASE ORAL DAILY
Qty: 30 TABLET | Refills: 5 | Status: SHIPPED | OUTPATIENT
Start: 2019-03-19 | End: 2019-03-25 | Stop reason: SDUPTHER

## 2019-03-19 RX ORDER — LEVALBUTEROL INHALATION SOLUTION 1.25 MG/3ML
1 SOLUTION RESPIRATORY (INHALATION) EVERY 6 HOURS PRN
Qty: 900 ML | Refills: 0 | Status: SHIPPED | OUTPATIENT
Start: 2019-03-19 | End: 2019-03-19 | Stop reason: SDUPTHER

## 2019-03-19 ASSESSMENT — PATIENT HEALTH QUESTIONNAIRE - PHQ9
SUM OF ALL RESPONSES TO PHQ9 QUESTIONS 1 & 2: 0
SUM OF ALL RESPONSES TO PHQ QUESTIONS 1-9: 0
2. FEELING DOWN, DEPRESSED OR HOPELESS: 0
SUM OF ALL RESPONSES TO PHQ QUESTIONS 1-9: 0
1. LITTLE INTEREST OR PLEASURE IN DOING THINGS: 0

## 2019-03-20 RX ORDER — LEVALBUTEROL INHALATION SOLUTION 1.25 MG/3ML
SOLUTION RESPIRATORY (INHALATION)
Qty: 4050 ML | Refills: 0 | Status: SHIPPED | OUTPATIENT
Start: 2019-03-20 | End: 2019-06-17 | Stop reason: ALTCHOICE

## 2019-03-20 RX ORDER — DOXAZOSIN MESYLATE 4 MG/1
4 TABLET ORAL NIGHTLY
Qty: 90 TABLET | Refills: 1 | Status: SHIPPED | OUTPATIENT
Start: 2019-03-20 | End: 2019-06-17 | Stop reason: SDUPTHER

## 2019-06-17 ENCOUNTER — OFFICE VISIT (OUTPATIENT)
Dept: INTERNAL MEDICINE CLINIC | Age: 69
End: 2019-06-17
Payer: MEDICARE

## 2019-06-17 VITALS
TEMPERATURE: 97.5 F | DIASTOLIC BLOOD PRESSURE: 88 MMHG | HEART RATE: 68 BPM | RESPIRATION RATE: 20 BRPM | SYSTOLIC BLOOD PRESSURE: 136 MMHG | WEIGHT: 138 LBS | BODY MASS INDEX: 21.78 KG/M2 | OXYGEN SATURATION: 94 %

## 2019-06-17 DIAGNOSIS — J43.1 PANLOBULAR EMPHYSEMA (HCC): ICD-10-CM

## 2019-06-17 DIAGNOSIS — I10 ESSENTIAL HYPERTENSION, BENIGN: ICD-10-CM

## 2019-06-17 DIAGNOSIS — Z23 NEED FOR PROPHYLACTIC VACCINATION AND INOCULATION AGAINST VARICELLA: Primary | ICD-10-CM

## 2019-06-17 PROCEDURE — G8926 SPIRO NO PERF OR DOC: HCPCS | Performed by: INTERNAL MEDICINE

## 2019-06-17 PROCEDURE — G8420 CALC BMI NORM PARAMETERS: HCPCS | Performed by: INTERNAL MEDICINE

## 2019-06-17 PROCEDURE — 1123F ACP DISCUSS/DSCN MKR DOCD: CPT | Performed by: INTERNAL MEDICINE

## 2019-06-17 PROCEDURE — 3023F SPIROM DOC REV: CPT | Performed by: INTERNAL MEDICINE

## 2019-06-17 PROCEDURE — G8428 CUR MEDS NOT DOCUMENT: HCPCS | Performed by: INTERNAL MEDICINE

## 2019-06-17 PROCEDURE — 99214 OFFICE O/P EST MOD 30 MIN: CPT | Performed by: INTERNAL MEDICINE

## 2019-06-17 PROCEDURE — 4040F PNEUMOC VAC/ADMIN/RCVD: CPT | Performed by: INTERNAL MEDICINE

## 2019-06-17 PROCEDURE — 1036F TOBACCO NON-USER: CPT | Performed by: INTERNAL MEDICINE

## 2019-06-17 PROCEDURE — 3017F COLORECTAL CA SCREEN DOC REV: CPT | Performed by: INTERNAL MEDICINE

## 2019-06-17 RX ORDER — DOXAZOSIN MESYLATE 4 MG/1
4 TABLET ORAL NIGHTLY
Qty: 90 TABLET | Refills: 1 | Status: SHIPPED | OUTPATIENT
Start: 2019-06-17 | End: 2019-12-19

## 2019-06-17 RX ORDER — GUAIFENESIN 600 MG/1
1200 TABLET, EXTENDED RELEASE ORAL DAILY
Qty: 30 TABLET | Refills: 5 | Status: SHIPPED | OUTPATIENT
Start: 2019-06-17 | End: 2019-12-19 | Stop reason: SDUPTHER

## 2019-06-17 RX ORDER — ALBUTEROL SULFATE 90 UG/1
2 AEROSOL, METERED RESPIRATORY (INHALATION) EVERY 6 HOURS PRN
Qty: 1 INHALER | Refills: 5 | Status: SHIPPED | OUTPATIENT
Start: 2019-06-17 | End: 2019-12-19 | Stop reason: SDUPTHER

## 2019-06-17 RX ORDER — NIFEDIPINE 60 MG/1
60 TABLET, EXTENDED RELEASE ORAL DAILY
Qty: 90 TABLET | Refills: 1 | Status: SHIPPED | OUTPATIENT
Start: 2019-06-17 | End: 2019-12-19

## 2019-06-17 RX ORDER — LEVOCETIRIZINE DIHYDROCHLORIDE 5 MG/1
5 TABLET, FILM COATED ORAL NIGHTLY
Qty: 90 TABLET | Refills: 1 | Status: SHIPPED | OUTPATIENT
Start: 2019-06-17 | End: 2019-12-19

## 2019-06-17 RX ORDER — BUDESONIDE AND FORMOTEROL FUMARATE DIHYDRATE 160; 4.5 UG/1; UG/1
2 AEROSOL RESPIRATORY (INHALATION) 2 TIMES DAILY
Qty: 1 INHALER | Refills: 5 | Status: SHIPPED | OUTPATIENT
Start: 2019-06-17 | End: 2019-12-19 | Stop reason: SDUPTHER

## 2019-06-17 NOTE — PROGRESS NOTES
PROGRESS NOTE:    Kendra Sky    6/24/2019    Chief Complaint   Patient presents with    3 Month Follow-Up     HPI:    (s)Regino Sky presents to clinic today with issues noted above. He is doing well overall, his baseline COPD seems to be controlled, but he feels the nebulizer machine is not working well. He states the Coca-[employer]a helps\". He is having some congestion and drainage. Patient is taking BP medications at home without side effects, BP is not being checked at home. Patient is tolerating anti-lipid meds such as statin without complications, no myalgia. Patient denies chest pain, SOB, NVD, FC, rash, malaise, rigor, dizziness/lightheadness, other pertinent ROS was also reviewed. /88 (Site: Left Upper Arm, Position: Sitting, Cuff Size: Small Adult)   Pulse 68 Comment: Regular  Temp 97.5 °F (36.4 °C) (Oral)   Resp 20   Wt 138 lb (62.6 kg)   SpO2 94% Comment: Room Air  BMI 21.78 kg/m²   Body mass index is 21.78 kg/m². Allergies   Allergen Reactions    Codeine Hives     Physical Exam:    Gen: Patient appears well groomed, well appearing  HEAD: Atraumatic, normocephalic,   Eyes: PERRLA, EOMI   Neck: supple, no thyroid nodule appreciated, no JVD  Chest: Clear to auscultation CRISTINO, diminished lung sounds, unlabored breathing, normal expansion, reduced retraction, barrel chested  Heart: Regular rate, regular rhythm, no murmur, no rub  Abdomen: Non-tender, non-distended, bowel sounds present x3  Extremities: no edema, distal pulses intact  Patient was alert and oriented to person, place and time  ENT: External auricles intact, canal clear left, canal clear right, TM bulge left, TM bulge right, nasal turbinates edematous appearing, post pharynx non-erythematous     Assessment and Plan:    Jyoti Linder was seen today for 3 month follow-up.     Diagnoses and all orders for this visit:    Essential hypertension, benign  Controlled overall, continue meds, follow renal function     Panlobular emphysema (HCC)  On daily ICS and prn nebs, continue mucinex, control nasal drainage, added xyzal    Need for prophylactic vaccination and inoculation against varicella  -     zoster recombinant adjuvanted vaccine (SHINGRIX) 50 MCG/0.5ML SUSR injection; Inject 0.5 mLs into the muscle once for 1 dose 50 MCG IM then repeat 2-6 months. Other orders  -     budesonide-formoterol (SYMBICORT) 160-4.5 MCG/ACT AERO; Inhale 2 puffs into the lungs 2 times daily  -     albuterol sulfate HFA (VENTOLIN HFA) 108 (90 Base) MCG/ACT inhaler; Inhale 2 puffs into the lungs every 6 hours as needed for Wheezing  -     guaiFENesin (MUCINEX) 600 MG extended release tablet; Take 2 tablets by mouth daily  -     doxazosin (CARDURA) 4 MG tablet; Take 1 tablet by mouth nightly  -     NIFEdipine (PROCARDIA XL) 60 MG extended release tablet; Take 1 tablet by mouth daily  -     tiotropium (SPIRIVA RESPIMAT) 1.25 MCG/ACT AERS inhaler; Inhale 2 puffs into the lungs daily  -     levocetirizine (XYZAL) 5 MG tablet; Take 1 tablet by mouth nightly    Preventive medicine: patient has agreed to indicated immunizations. Needs Shingrix, FIT neg 2019. No orders of the defined types were placed in this encounter. Prior to Admission medications    Medication Sig Start Date End Date Taking?  Authorizing Provider   budesonide-formoterol (SYMBICORT) 160-4.5 MCG/ACT AERO Inhale 2 puffs into the lungs 2 times daily 6/17/19  Yes Jose Han DO   albuterol sulfate HFA (VENTOLIN HFA) 108 (90 Base) MCG/ACT inhaler Inhale 2 puffs into the lungs every 6 hours as needed for Wheezing 6/17/19  Yes Jose Han DO   guaiFENesin (MUCINEX) 600 MG extended release tablet Take 2 tablets by mouth daily 6/17/19  Yes Jose Han DO   doxazosin (CARDURA) 4 MG tablet Take 1 tablet by mouth nightly 6/17/19  Yes Jose Han DO   NIFEdipine (PROCARDIA XL) 60 MG extended release tablet Take 1 tablet by mouth daily 6/17/19  Yes Jose Han DO tiotropium (SPIRIVA RESPIMAT) 1.25 MCG/ACT AERS inhaler Inhale 2 puffs into the lungs daily 19  Yes Debi Gates, DO   levocetirizine (XYZAL) 5 MG tablet Take 1 tablet by mouth nightly 19  Yes Debi Gaets, DO   Respiratory Therapy Supplies (NEBULIZER) LILIANE 1 applicator by Does not apply route 4 times daily as needed (SOB/wheezing) 18  Yes Debi Gates, DO   Multiple Vitamins-Minerals (THERAPEUTIC MULTIVITAMIN-MINERALS) tablet Take 1 tablet by mouth daily   Yes Historical Provider, MD   tadalafil (CIALIS) 5 MG tablet Take 1 tablet by mouth as needed for Erectile Dysfunction 16   Mary Medel MD       Past Medical History:   Diagnosis Date    Asthma     Hyperlipidemia        Past Surgical History:   Procedure Laterality Date    CARDIAC CATHETERIZATION  2013    Dr. Alvin Murray      Right inguinal hernia repair    UPPER GASTROINTESTINAL ENDOSCOPY  2017    Dr. Linh Castro, haital hernia, mid gastric stricture.         Social History     Tobacco Use    Smoking status: Former Smoker     Packs/day: 0.50     Years: 45.00     Pack years: 22.50     Last attempt to quit: 10/21/2015     Years since quitting: 3.6    Smokeless tobacco: Never Used   Substance Use Topics    Alcohol use: No     Alcohol/week: 0.0 oz       Family History   Problem Relation Age of Onset    Other Mother         80years old-healthy    Other Father          young-heart attack    Other Brother         Healthy 61year old   Lupe Ramírez Other Sister         61year old-unknown medical history    Other Sister         Healthy 62year old

## 2019-06-17 NOTE — PATIENT INSTRUCTIONS

## 2019-09-12 ENCOUNTER — FOLLOWUP TELEPHONE ENCOUNTER (OUTPATIENT)
Dept: NEPHROLOGY | Age: 69
End: 2019-09-12

## 2019-12-19 ENCOUNTER — OFFICE VISIT (OUTPATIENT)
Dept: INTERNAL MEDICINE CLINIC | Age: 69
End: 2019-12-19
Payer: MEDICARE

## 2019-12-19 VITALS
RESPIRATION RATE: 24 BRPM | SYSTOLIC BLOOD PRESSURE: 140 MMHG | DIASTOLIC BLOOD PRESSURE: 100 MMHG | BODY MASS INDEX: 21.46 KG/M2 | OXYGEN SATURATION: 93 % | WEIGHT: 136 LBS | HEART RATE: 78 BPM | TEMPERATURE: 97.8 F

## 2019-12-19 DIAGNOSIS — Z11.59 ENCOUNTER FOR HEPATITIS C SCREENING TEST FOR LOW RISK PATIENT: ICD-10-CM

## 2019-12-19 DIAGNOSIS — J43.9 PULMONARY EMPHYSEMA, UNSPECIFIED EMPHYSEMA TYPE (HCC): Primary | ICD-10-CM

## 2019-12-19 DIAGNOSIS — R06.2 WHEEZING: ICD-10-CM

## 2019-12-19 DIAGNOSIS — I10 ESSENTIAL HYPERTENSION, BENIGN: ICD-10-CM

## 2019-12-19 DIAGNOSIS — D64.9 ANEMIA, UNSPECIFIED TYPE: ICD-10-CM

## 2019-12-19 DIAGNOSIS — Z12.5 SCREENING FOR MALIGNANT NEOPLASM OF PROSTATE: ICD-10-CM

## 2019-12-19 DIAGNOSIS — E78.5 HYPERLIPIDEMIA, UNSPECIFIED HYPERLIPIDEMIA TYPE: ICD-10-CM

## 2019-12-19 DIAGNOSIS — N18.9 CHRONIC KIDNEY DISEASE, UNSPECIFIED CKD STAGE: ICD-10-CM

## 2019-12-19 DIAGNOSIS — Z23 NEEDS FLU SHOT: ICD-10-CM

## 2019-12-19 DIAGNOSIS — Z13.220 SCREENING, LIPID: ICD-10-CM

## 2019-12-19 PROCEDURE — 99214 OFFICE O/P EST MOD 30 MIN: CPT | Performed by: INTERNAL MEDICINE

## 2019-12-19 PROCEDURE — 3017F COLORECTAL CA SCREEN DOC REV: CPT | Performed by: INTERNAL MEDICINE

## 2019-12-19 PROCEDURE — 90653 IIV ADJUVANT VACCINE IM: CPT | Performed by: INTERNAL MEDICINE

## 2019-12-19 PROCEDURE — 94640 AIRWAY INHALATION TREATMENT: CPT | Performed by: INTERNAL MEDICINE

## 2019-12-19 PROCEDURE — 1036F TOBACCO NON-USER: CPT | Performed by: INTERNAL MEDICINE

## 2019-12-19 PROCEDURE — 1123F ACP DISCUSS/DSCN MKR DOCD: CPT | Performed by: INTERNAL MEDICINE

## 2019-12-19 PROCEDURE — G8482 FLU IMMUNIZE ORDER/ADMIN: HCPCS | Performed by: INTERNAL MEDICINE

## 2019-12-19 PROCEDURE — G0008 ADMIN INFLUENZA VIRUS VAC: HCPCS | Performed by: INTERNAL MEDICINE

## 2019-12-19 PROCEDURE — 4040F PNEUMOC VAC/ADMIN/RCVD: CPT | Performed by: INTERNAL MEDICINE

## 2019-12-19 PROCEDURE — 3023F SPIROM DOC REV: CPT | Performed by: INTERNAL MEDICINE

## 2019-12-19 PROCEDURE — G8926 SPIRO NO PERF OR DOC: HCPCS | Performed by: INTERNAL MEDICINE

## 2019-12-19 PROCEDURE — G8427 DOCREV CUR MEDS BY ELIG CLIN: HCPCS | Performed by: INTERNAL MEDICINE

## 2019-12-19 PROCEDURE — G8420 CALC BMI NORM PARAMETERS: HCPCS | Performed by: INTERNAL MEDICINE

## 2019-12-19 RX ORDER — METHYLPREDNISOLONE 4 MG/1
TABLET ORAL
Qty: 1 KIT | Refills: 0 | Status: SHIPPED | OUTPATIENT
Start: 2019-12-19 | End: 2019-12-25

## 2019-12-19 RX ORDER — NIFEDIPINE 90 MG/1
90 TABLET, FILM COATED, EXTENDED RELEASE ORAL DAILY
Qty: 30 TABLET | Refills: 2 | Status: SHIPPED | OUTPATIENT
Start: 2019-12-19 | End: 2019-12-19

## 2019-12-19 RX ORDER — BUDESONIDE AND FORMOTEROL FUMARATE DIHYDRATE 160; 4.5 UG/1; UG/1
2 AEROSOL RESPIRATORY (INHALATION) 2 TIMES DAILY
Qty: 1 INHALER | Refills: 5 | Status: SHIPPED | OUTPATIENT
Start: 2019-12-19 | End: 2020-09-28

## 2019-12-19 RX ORDER — GUAIFENESIN 600 MG/1
1200 TABLET, EXTENDED RELEASE ORAL DAILY
Qty: 30 TABLET | Refills: 5 | Status: SHIPPED | OUTPATIENT
Start: 2019-12-19 | End: 2020-02-10 | Stop reason: CLARIF

## 2019-12-19 RX ORDER — ALBUTEROL SULFATE 90 UG/1
2 AEROSOL, METERED RESPIRATORY (INHALATION) EVERY 6 HOURS PRN
Qty: 1 INHALER | Refills: 5 | Status: ON HOLD | OUTPATIENT
Start: 2019-12-19 | End: 2020-02-11 | Stop reason: HOSPADM

## 2019-12-19 ASSESSMENT — ENCOUNTER SYMPTOMS
TROUBLE SWALLOWING: 0
DIARRHEA: 0
SORE THROAT: 0
RHINORRHEA: 0
ABDOMINAL PAIN: 0
NAUSEA: 0
SHORTNESS OF BREATH: 1
WHEEZING: 1
COUGH: 1

## 2020-01-20 ENCOUNTER — OFFICE VISIT (OUTPATIENT)
Dept: PULMONOLOGY | Age: 70
End: 2020-01-20
Payer: MEDICARE

## 2020-01-20 VITALS
HEIGHT: 68 IN | DIASTOLIC BLOOD PRESSURE: 80 MMHG | RESPIRATION RATE: 16 BRPM | HEART RATE: 90 BPM | SYSTOLIC BLOOD PRESSURE: 112 MMHG | BODY MASS INDEX: 20.76 KG/M2 | OXYGEN SATURATION: 89 % | WEIGHT: 137 LBS

## 2020-01-20 PROCEDURE — G8482 FLU IMMUNIZE ORDER/ADMIN: HCPCS | Performed by: INTERNAL MEDICINE

## 2020-01-20 PROCEDURE — G8420 CALC BMI NORM PARAMETERS: HCPCS | Performed by: INTERNAL MEDICINE

## 2020-01-20 PROCEDURE — 1123F ACP DISCUSS/DSCN MKR DOCD: CPT | Performed by: INTERNAL MEDICINE

## 2020-01-20 PROCEDURE — 3017F COLORECTAL CA SCREEN DOC REV: CPT | Performed by: INTERNAL MEDICINE

## 2020-01-20 PROCEDURE — 1036F TOBACCO NON-USER: CPT | Performed by: INTERNAL MEDICINE

## 2020-01-20 PROCEDURE — G8926 SPIRO NO PERF OR DOC: HCPCS | Performed by: INTERNAL MEDICINE

## 2020-01-20 PROCEDURE — 3023F SPIROM DOC REV: CPT | Performed by: INTERNAL MEDICINE

## 2020-01-20 PROCEDURE — 99204 OFFICE O/P NEW MOD 45 MIN: CPT | Performed by: INTERNAL MEDICINE

## 2020-01-20 PROCEDURE — 4040F PNEUMOC VAC/ADMIN/RCVD: CPT | Performed by: INTERNAL MEDICINE

## 2020-01-20 PROCEDURE — G8427 DOCREV CUR MEDS BY ELIG CLIN: HCPCS | Performed by: INTERNAL MEDICINE

## 2020-01-20 RX ORDER — PREDNISONE 20 MG/1
40 TABLET ORAL DAILY
Qty: 14 TABLET | Refills: 0 | Status: SHIPPED | OUTPATIENT
Start: 2020-01-20 | End: 2020-02-10 | Stop reason: ALTCHOICE

## 2020-01-20 NOTE — PROGRESS NOTES
appears stated age  [de-identified]: No oropharyngeal exudate, PERRL, no cervical adenopathy, no tracheal deviation, thyroid size normal  LUNGS:  No increased work of breathing and clear to auscultation, no crackles or wheezing  CARDIOVASCULAR:  normal S1 and S2 and no JVD  ABDOMEN:  Normal bowel sounds, non-distended and non-tender to palpation  EXT: No edema, no calf tenderness. Pulses are present bilaterally. NEUROLOGIC:  Mental Status Exam:  Level of Alertness:   awake  Orientation:   person, place, time. SKIN:  normal skin color, texture, turgor, no redness, warmth, or swelling     DATA:      A1AT 164    Radiology Review:  Pertinent images / reports were reviewed as a part of this visit. CT Chest [facility] March 6, 2018 reveals the following:  IMPRESSION:    1. Severe emphysema. 2. No evidence of any acute pulmonary disease. 3. No evidence of any pulmonary neoplasm. PFTs:   Date: 10/9/2013 10:23:35 IT                         SPIROMETRY                  Units    Pred  PreDrug  %Pred  PostDrug  %Pred  %Chg  FVC         L,btps       3.36     1.96     74      0.58     28    47  FEV1        L,btps       2.57     0.66     92      9.01     21    55  FEV1/FVC    %           78.       82.       43     35.       45     5  FEV3        L,btps       3.26     1. 14     01      5.80     19    37  UUD28-22%   L/s          2.37     0.20      8      0.31     89    82  FEFmax      L/s          7.29     1.52     77      6.79     21    -1  FIFmax      L/s                   2.49             2.52          -88  TET         A                    48.25            07.91           -9    PLETHYSMOGRAPHY                     Units    Pred  PreDrug  %Pred   TLC            L,btps       5.52    12.01    217   FRC            L,btps       3.42    10.58    309   RV             L,btps       2.17     9.74    450   ERV            L,btps       1.25     0.84     67   RV/TLC         %           39.      81.      207   VTG            L,btps

## 2020-02-10 ENCOUNTER — HOSPITAL ENCOUNTER (OUTPATIENT)
Age: 70
Setting detail: OBSERVATION
LOS: 1 days | Discharge: HOME | End: 2020-02-11
Attending: EMERGENCY MEDICINE | Admitting: INTERNAL MEDICINE
Payer: MEDICARE

## 2020-02-10 ENCOUNTER — HOSPITAL ENCOUNTER (OUTPATIENT)
Dept: CT IMAGING | Age: 70
Discharge: HOME | End: 2020-02-10
Payer: MEDICARE

## 2020-02-10 ENCOUNTER — APPOINTMENT (OUTPATIENT)
Dept: GENERAL RADIOLOGY | Age: 70
End: 2020-02-10
Payer: MEDICARE

## 2020-02-10 ENCOUNTER — HOSPITAL ENCOUNTER (OUTPATIENT)
Dept: PULMONOLOGY | Age: 70
Discharge: HOME | End: 2020-02-10
Payer: MEDICARE

## 2020-02-10 ENCOUNTER — HOSPITAL ENCOUNTER (OUTPATIENT)
Dept: NON INVASIVE DIAGNOSTICS | Age: 70
Discharge: HOME | End: 2020-02-10
Payer: MEDICARE

## 2020-02-10 ENCOUNTER — TELEPHONE (OUTPATIENT)
Dept: PULMONOLOGY | Age: 70
End: 2020-02-10

## 2020-02-10 PROBLEM — J44.1 COPD EXACERBATION (HCC): Status: ACTIVE | Noted: 2020-02-10

## 2020-02-10 PROBLEM — R09.02 HYPOXIA: Status: ACTIVE | Noted: 2020-02-10

## 2020-02-10 LAB
ANION GAP SERPL CALCULATED.3IONS-SCNC: 11 MMOL/L (ref 3–16)
BASE EXCESS VENOUS: 3.3 MMOL/L (ref -2–3)
BASOPHILS ABSOLUTE: 0 K/UL (ref 0–0.2)
BASOPHILS RELATIVE PERCENT: 0.6 %
BUN BLDV-MCNC: 31 MG/DL (ref 7–20)
CALCIUM SERPL-MCNC: 10.3 MG/DL (ref 8.3–10.6)
CARBOXYHEMOGLOBIN: 1.5 % (ref 0–1.5)
CHLORIDE BLD-SCNC: 99 MMOL/L (ref 99–110)
CO2: 30 MMOL/L (ref 21–32)
CREAT SERPL-MCNC: 1.4 MG/DL (ref 0.8–1.3)
EOSINOPHILS ABSOLUTE: 0 K/UL (ref 0–0.6)
EOSINOPHILS RELATIVE PERCENT: 0.6 %
GFR AFRICAN AMERICAN: >60
GFR NON-AFRICAN AMERICAN: 50
GLUCOSE BLD-MCNC: 104 MG/DL (ref 70–99)
HCO3 VENOUS: 32.9 MMOL/L (ref 24–28)
HCT VFR BLD CALC: 40.3 % (ref 40.5–52.5)
HEMOGLOBIN, VEN, REDUCED: 61 %
HEMOGLOBIN: 12.8 G/DL (ref 13.5–17.5)
LV EF: 53 %
LVEF MODALITY: NORMAL
LYMPHOCYTES ABSOLUTE: 1.1 K/UL (ref 1–5.1)
LYMPHOCYTES RELATIVE PERCENT: 33.6 %
MCH RBC QN AUTO: 29.4 PG (ref 26–34)
MCHC RBC AUTO-ENTMCNC: 31.8 G/DL (ref 31–36)
MCV RBC AUTO: 92.3 FL (ref 80–100)
METHEMOGLOBIN VENOUS: 0.5 % (ref 0–1.5)
MONOCYTES ABSOLUTE: 0.5 K/UL (ref 0–1.3)
MONOCYTES RELATIVE PERCENT: 14.4 %
NEUTROPHILS ABSOLUTE: 1.7 K/UL (ref 1.7–7.7)
NEUTROPHILS RELATIVE PERCENT: 50.8 %
O2 SAT, VEN: 38 %
PCO2, VEN: 78.7 MMHG (ref 41–51)
PDW BLD-RTO: 15.1 % (ref 12.4–15.4)
PH VENOUS: 7.24 (ref 7.35–7.45)
PLATELET # BLD: 225 K/UL (ref 135–450)
PMV BLD AUTO: 7.3 FL (ref 5–10.5)
PO2, VEN: 27.8 MMHG (ref 25–40)
POTASSIUM REFLEX MAGNESIUM: 4 MMOL/L (ref 3.5–5.1)
PRO-BNP: 264 PG/ML (ref 0–124)
RBC # BLD: 4.36 M/UL (ref 4.2–5.9)
SODIUM BLD-SCNC: 140 MMOL/L (ref 136–145)
TCO2 CALC VENOUS: 35 MMOL/L
TROPONIN: <0.01 NG/ML
WBC # BLD: 3.3 K/UL (ref 4–11)

## 2020-02-10 PROCEDURE — 2580000003 HC RX 258: Performed by: SURGERY

## 2020-02-10 PROCEDURE — 6370000000 HC RX 637 (ALT 250 FOR IP): Performed by: EMERGENCY MEDICINE

## 2020-02-10 PROCEDURE — 93005 ELECTROCARDIOGRAM TRACING: CPT | Performed by: EMERGENCY MEDICINE

## 2020-02-10 PROCEDURE — 82803 BLOOD GASES ANY COMBINATION: CPT

## 2020-02-10 PROCEDURE — 94726 PLETHYSMOGRAPHY LUNG VOLUMES: CPT

## 2020-02-10 PROCEDURE — 99285 EMERGENCY DEPT VISIT HI MDM: CPT

## 2020-02-10 PROCEDURE — 71046 X-RAY EXAM CHEST 2 VIEWS: CPT

## 2020-02-10 PROCEDURE — 94760 N-INVAS EAR/PLS OXIMETRY 1: CPT

## 2020-02-10 PROCEDURE — 36415 COLL VENOUS BLD VENIPUNCTURE: CPT

## 2020-02-10 PROCEDURE — 6370000000 HC RX 637 (ALT 250 FOR IP): Performed by: SURGERY

## 2020-02-10 PROCEDURE — 83880 ASSAY OF NATRIURETIC PEPTIDE: CPT

## 2020-02-10 PROCEDURE — 93306 TTE W/DOPPLER COMPLETE: CPT

## 2020-02-10 PROCEDURE — 6360000002 HC RX W HCPCS: Performed by: INTERNAL MEDICINE

## 2020-02-10 PROCEDURE — 94761 N-INVAS EAR/PLS OXIMETRY MLT: CPT

## 2020-02-10 PROCEDURE — 80048 BASIC METABOLIC PNL TOTAL CA: CPT

## 2020-02-10 PROCEDURE — 2700000000 HC OXYGEN THERAPY PER DAY

## 2020-02-10 PROCEDURE — 71250 CT THORAX DX C-: CPT

## 2020-02-10 PROCEDURE — 94664 DEMO&/EVAL PT USE INHALER: CPT

## 2020-02-10 PROCEDURE — G0378 HOSPITAL OBSERVATION PER HR: HCPCS

## 2020-02-10 PROCEDURE — 94060 EVALUATION OF WHEEZING: CPT

## 2020-02-10 PROCEDURE — 84484 ASSAY OF TROPONIN QUANT: CPT

## 2020-02-10 PROCEDURE — 94010 BREATHING CAPACITY TEST: CPT

## 2020-02-10 PROCEDURE — 94640 AIRWAY INHALATION TREATMENT: CPT

## 2020-02-10 PROCEDURE — 94729 DIFFUSING CAPACITY: CPT

## 2020-02-10 PROCEDURE — 85025 COMPLETE CBC W/AUTO DIFF WBC: CPT

## 2020-02-10 RX ORDER — ALBUTEROL SULFATE 90 UG/1
2 AEROSOL, METERED RESPIRATORY (INHALATION)
Status: DISCONTINUED | OUTPATIENT
Start: 2020-02-11 | End: 2020-02-11 | Stop reason: HOSPADM

## 2020-02-10 RX ORDER — SODIUM CHLORIDE 0.9 % (FLUSH) 0.9 %
10 SYRINGE (ML) INJECTION EVERY 12 HOURS SCHEDULED
Status: DISCONTINUED | OUTPATIENT
Start: 2020-02-10 | End: 2020-02-11 | Stop reason: HOSPADM

## 2020-02-10 RX ORDER — PREDNISONE 20 MG/1
40 TABLET ORAL ONCE
Status: COMPLETED | OUTPATIENT
Start: 2020-02-10 | End: 2020-02-10

## 2020-02-10 RX ORDER — ATORVASTATIN CALCIUM 40 MG/1
40 TABLET, FILM COATED ORAL NIGHTLY
Status: DISCONTINUED | OUTPATIENT
Start: 2020-02-10 | End: 2020-02-11 | Stop reason: HOSPADM

## 2020-02-10 RX ORDER — PREDNISONE 20 MG/1
40 TABLET ORAL DAILY
Qty: 14 TABLET | Refills: 0 | Status: ON HOLD | OUTPATIENT
Start: 2020-02-10 | End: 2020-02-11 | Stop reason: SDUPTHER

## 2020-02-10 RX ORDER — NIFEDIPINE 30 MG/1
90 TABLET, FILM COATED, EXTENDED RELEASE ORAL DAILY
Status: DISCONTINUED | OUTPATIENT
Start: 2020-02-10 | End: 2020-02-11 | Stop reason: HOSPADM

## 2020-02-10 RX ORDER — GUAIFENESIN 600 MG/1
600 TABLET, EXTENDED RELEASE ORAL DAILY
Status: DISCONTINUED | OUTPATIENT
Start: 2020-02-10 | End: 2020-02-11 | Stop reason: HOSPADM

## 2020-02-10 RX ORDER — AZITHROMYCIN 250 MG/1
500 TABLET, FILM COATED ORAL ONCE
Status: COMPLETED | OUTPATIENT
Start: 2020-02-10 | End: 2020-02-10

## 2020-02-10 RX ORDER — ALBUTEROL SULFATE 2.5 MG/3ML
2.5 SOLUTION RESPIRATORY (INHALATION) ONCE
Status: COMPLETED | OUTPATIENT
Start: 2020-02-10 | End: 2020-02-10

## 2020-02-10 RX ORDER — GUAIFENESIN 600 MG/1
600 TABLET, EXTENDED RELEASE ORAL DAILY
COMMUNITY

## 2020-02-10 RX ORDER — ONDANSETRON 2 MG/ML
4 INJECTION INTRAMUSCULAR; INTRAVENOUS EVERY 6 HOURS PRN
Status: DISCONTINUED | OUTPATIENT
Start: 2020-02-10 | End: 2020-02-11 | Stop reason: HOSPADM

## 2020-02-10 RX ORDER — ALBUTEROL SULFATE 90 UG/1
2 AEROSOL, METERED RESPIRATORY (INHALATION) EVERY 6 HOURS PRN
Status: DISCONTINUED | OUTPATIENT
Start: 2020-02-10 | End: 2020-02-10

## 2020-02-10 RX ORDER — PREDNISONE 20 MG/1
40 TABLET ORAL DAILY
Status: DISCONTINUED | OUTPATIENT
Start: 2020-02-11 | End: 2020-02-11 | Stop reason: HOSPADM

## 2020-02-10 RX ORDER — SODIUM CHLORIDE 0.9 % (FLUSH) 0.9 %
10 SYRINGE (ML) INJECTION PRN
Status: DISCONTINUED | OUTPATIENT
Start: 2020-02-10 | End: 2020-02-11 | Stop reason: HOSPADM

## 2020-02-10 RX ORDER — ALBUTEROL SULFATE 90 UG/1
5 AEROSOL, METERED RESPIRATORY (INHALATION) ONCE
Status: COMPLETED | OUTPATIENT
Start: 2020-02-10 | End: 2020-02-10

## 2020-02-10 RX ADMIN — Medication 10 ML: at 23:09

## 2020-02-10 RX ADMIN — NIFEDIPINE 90 MG: 30 TABLET, EXTENDED RELEASE ORAL at 17:37

## 2020-02-10 RX ADMIN — AZITHROMYCIN MONOHYDRATE 500 MG: 250 TABLET ORAL at 17:38

## 2020-02-10 RX ADMIN — ALBUTEROL SULFATE 2.5 MG: 2.5 SOLUTION RESPIRATORY (INHALATION) at 17:16

## 2020-02-10 RX ADMIN — ALBUTEROL SULFATE 5 PUFF: 90 AEROSOL, METERED RESPIRATORY (INHALATION) at 13:45

## 2020-02-10 RX ADMIN — PREDNISONE 40 MG: 20 TABLET ORAL at 12:25

## 2020-02-10 RX ADMIN — GUAIFENESIN 600 MG: 600 TABLET, EXTENDED RELEASE ORAL at 17:37

## 2020-02-10 ASSESSMENT — PAIN SCALES - GENERAL
PAINLEVEL_OUTOF10: 0

## 2020-02-10 NOTE — PROGRESS NOTES
4 Eyes Admission Assessment     I agree as the admission nurse that 2 RN's have performed a thorough Head to Toe Skin Assessment on the patient. ALL assessment sites listed below have been assessed on admission. Areas assessed by both nurses:   [x]   Head, Face, and Ears   [x]   Shoulders, Back, and Chest  [x]   Arms, Elbows, and Hands   [x]   Coccyx, Sacrum, and Ischum  [x]   Legs, Feet, and Heels        Does the Patient have Skin Breakdown?  No , pt refused for nurse to assess chantell area and buttocks states I do not have any wounds, no skin issue noted on rest of skin      Dandy Prevention initiated:  No   Wound Care Orders initiated:  No      WOC nurse consulted for Pressure Injury (Stage 3,4, Unstageable, DTI, NWPT, and Complex wounds):  No      Nurse 1 eSignature: Electronically signed by Valdez Jackson RN on 2/10/20 at 5:25 PM    **SHARE this note so that the co-signing nurse is able to place an eSignature**    Nurse 2 eSignature: Electronically signed by Mariajose Wild RN on 2/10/20 at 6:28 PM

## 2020-02-10 NOTE — PROGRESS NOTES
Pt in for outpatient testing. Stated he was very Short of Breath. Pox on Room Air = 86%. Patient was placed on 2 lpm via NC. Pt completed his Echocardiogram with 2 lpm O2 via NC and a Pox reading of 92%. DR. Smallwood Client was called about Pt's need for O2 and Shortness of Breath.

## 2020-02-10 NOTE — CARE COORDINATION
Case Management Assessment           Initial Evaluation                Date / Time of Evaluation: 2/10/2020 2:50 PM                 Assessment Completed by: Kaity Loyola    Patient Name: Fabian Markham     YOB: 1950  Diagnosis: COPD exacerbation [facility]) [J44.1]     Date / Time: 2/10/2020 11:49 AM    Patient Admission Status: Inpatient    If patient is discharged prior to next notation, then this note serves as note for discharge by case management. Current PCP: Catalina Gonzalez MD  Clinic Patient: No    Chart Reviewed: Yes  Patient/ Family Interviewed: Yes    Initial assessment completed at bedside with: Patient and daughter    Hospitalization in the last 30 days: No    Emergency Contacts:  Extended Emergency Contact Information  Primary Emergency Contact: [place] [address] Phone: [phone]  Relation: Child    Advance Directives:   Code Status: No Order    Healthcare Power of : No    Financial  Payor: Yassine Calderóner / Plan: [place] / Product Type: *No Product type* /     Pre-cert required for SNF: Yes    Pharmacy    [place] [id number] #[id number] - [phone]  [facility] [address]  Phone: [phone] Fax: [phone]      Potential assistance Purchasing Medications:    Does Patient want to participate in local refill/ meds to beds program?:      Meds To Beds General Rules:  1. Can ONLY be done Monday- Friday between 8:30am-5pm  2. Prescription(s) must be in pharmacy by 3pm to be filled same day  3. Copy of patient's insurance/ prescription drug card and patient face sheet must be sent along with the prescription(s)  4. Cost of Rx cannot be added to hospital bill. If financial assistance is needed, please contact unit  or ;  or  CANNOT provide pharmacy voucher for patients co-pays  5.  Patients can then pick up the prescription on their way out of the hospital at discharge, or pharmacy can deliver to the bedside if staff is available. (payment due at time of pick-up or delivery - cash, check, or card accepted)     Able to afford home medications/ co-pay costs: Yes    ADLS  Support Systems:      PT AM-PAC:   /24  OT AM-PAC:   /24    [place]: at home with daughter  Steps:     Plans to RETURN to current housing: Yes  Barriers to RETURNING to current housing: medical complications    Home Care Information  Currently ACTIVE with [address]: No  Home Care Agency: Not Applicable    Currently ACTIVE with [facility]: No      Durable Medical Equipment  DME Provider:   Equipment: none    Home Oxygen and [address] [place] prior to admission: No  [address]: Not Applicable    Dialysis  Active with HD/PD prior to admission: No  Nephrologist: [address]:  Not Applicable    DISCHARGE PLAN:  Disposition: Home- No Services Needed    Transportation PLAN for discharge: family     Factors facilitating achievement of predicted outcomes: Family support, Cooperative and Pleasant    Barriers to discharge: Medical complications    Additional Case Management Notes: Referred to patient for d/c planning. Spoke to patient and daughter at bedside. Patient usually lives at home with daughter. Patient reports he is independent in ADL. Patient denies d/c needs at this time. May need home oxygen. The Plan for Transition of Care is related to the following treatment goals of COPD exacerbation ([facility] [J44.1]    The Patient and/or patient representative Nirali Olivera and his family were provided with a choice of provider and agrees with the discharge plan Not Indicated    Freedom of choice list was provided with basic dialogue that supports the patient's individualized plan of care/goals and shares the quality data associated with the providers.  Not Indicated      Care Transition patient:

## 2020-02-10 NOTE — ED PROVIDER NOTES
[address] [facility]          ATTENDING PHYSICIAN NOTE       Date of evaluation: 2/10/2020    Chief Complaint     Shortness of Breath      History of Present Illness     Fabian Markham is a 78 y.o. male who presents from the PFT lab after episode of hypoxia and shortness of breath. The patient has been at the hospital since 8 AM this morning getting tests ordered by Dr. Emanuel Ascencio. He was in the middle of PFTs when he was noted to have an O2 sat of 86% on room air as well as dyspnea on exertion. This was being done to determine if he qualifies for oxygen. The patient does have a diagnosis of COPD and recently completed a prednisone burst in January which helped him. The patient states that he was given an albuterol nebulized treatment which made him feel significantly short of breath and thus he was transferred to the emergency department. He was initially on oxygen on arrival but is now on room air. He denies any shortness of breath or chest pain at this time. Does note that over weeks to months he has had some dyspnea on exertion that gets better with rest and with using his albuterol inhaler. Was feeling fine prior to PFTs. No other aggravating relieving factors or associated symptoms. Review of Systems     Positive for shortness of breath. Negative for fever, vomiting, chest pain, dyan pain. All other systems were reviewed and are negative, except as mentioned in HPI. Past Medical, Surgical, Family, and Social History     He has a past medical history of Anemia, Asthma, Chronic kidney disease, COPD (chronic obstructive pulmonary disease) ([address] Essential hypertension, and Hyperlipidemia. He has a past surgical history that includes hernia repair (2011); Cardiac catheterization (11/2013); and Upper gastrointestinal endoscopy (06/23/2017). His family history includes Other in his brother, father, mother, sister, and sister.   He reports that he quit smoking about specific interval measurements. Impression: Normal sinus rhythm at 84 bpm, normal intervals, normal axis, T wave inversions in the inferior and lateral leads with no EKG for comparison        RECENT VITALS:  BP: (!) 146/97, Temp: 98.2 °F (36.8 °C), Pulse: 76, Resp: 18     Procedures         ED Course     Nursing Notes, Past Medical Hx, Past Surgical Hx, Social Hx, Allergies, and Family Hx were reviewed. The patient was given the following medications:  Orders Placed This Encounter   Medications    predniSONE (DELTASONE) tablet 40 mg    albuterol sulfate  (90 Base) MCG/ACT inhaler 5 puff    ipratropium (ATROVENT HFA) 17 MCG/ACT inhaler 2 puff    azithromycin (ZITHROMAX) tablet 500 mg       CONSULTS:  IP CONSULT TO PULMONOLOGY  IP CONSULT TO HOSPITALIST    MEDICAL DECISION MAKING / ASSESSMENT / Kandibeatriz Guzmán is a 78 y.o. male with dyspnea on exertion during PFTs. The patient was given 40 mg oral prednisone as Dr. Pizarro Dial note indicates that he was going to reorder a prednisone burst.  The patient states he \"does not do well\" with nebulized treatments and did not want any further albuterol. O2 saturations are normal while the patient is at rest and off oxygen. EKG with TWI in inferolateral leads with no old for comparison. CXR with bibasilar atelectasis vs infiltrate (favor atelectasis). CBC with WBC of 3.3 and hemoglobin 12.8. Renal with normal electrolytes, BUN mildly elevated at 31 with creatinine of 1.4. Trop negative. BNP unremarkable. VBG with respiratory acidosis worse from baseline with pH of 7.24 and PCO2 of 78.7. The patient does not wish to take any nebulized treatments even after prolonged discussion about it and so I have ordered albuterol and Atrovent inhalers for the patient. The patient will also be given azithromycin. Given the patient's respiratory acidosis I think the patient would benefit from admission to the hospital for further acute management.         Clinical Impression     1. COPD exacerbation ([facility]    2. Respiratory acidosis        Disposition     PATIENT REFERRED TO:  No follow-up provider specified.     DISCHARGE MEDICATIONS:  New Prescriptions    No medications on file       Hema Williamson MD  02/10/20 7629

## 2020-02-10 NOTE — ED NOTES
This RN attempted to call report, RN was unavailable at this time. Will try again shortly.      Aria Ferris RN  02/10/20 [id number]

## 2020-02-11 VITALS
DIASTOLIC BLOOD PRESSURE: 95 MMHG | RESPIRATION RATE: 18 BRPM | OXYGEN SATURATION: 96 % | WEIGHT: 140 LBS | BODY MASS INDEX: 21.97 KG/M2 | HEART RATE: 74 BPM | SYSTOLIC BLOOD PRESSURE: 136 MMHG | HEIGHT: 67 IN | TEMPERATURE: 96.6 F

## 2020-02-11 LAB
ALBUMIN SERPL-MCNC: 4.1 G/DL (ref 3.4–5)
ANION GAP SERPL CALCULATED.3IONS-SCNC: 12 MMOL/L (ref 3–16)
BASOPHILS ABSOLUTE: 0 K/UL (ref 0–0.2)
BASOPHILS RELATIVE PERCENT: 0.3 %
BUN BLDV-MCNC: 26 MG/DL (ref 7–20)
CALCIUM SERPL-MCNC: 9.7 MG/DL (ref 8.3–10.6)
CHLORIDE BLD-SCNC: 101 MMOL/L (ref 99–110)
CO2: 28 MMOL/L (ref 21–32)
CREAT SERPL-MCNC: 1.4 MG/DL (ref 0.8–1.3)
EKG ATRIAL RATE: 84 BPM
EKG DIAGNOSIS: NORMAL
EKG P AXIS: 89 DEGREES
EKG P-R INTERVAL: 138 MS
EKG Q-T INTERVAL: 368 MS
EKG QRS DURATION: 86 MS
EKG QTC CALCULATION (BAZETT): 434 MS
EKG R AXIS: 50 DEGREES
EKG T AXIS: 270 DEGREES
EKG VENTRICULAR RATE: 84 BPM
EOSINOPHILS ABSOLUTE: 0 K/UL (ref 0–0.6)
EOSINOPHILS RELATIVE PERCENT: 0.5 %
GFR AFRICAN AMERICAN: >60
GFR NON-AFRICAN AMERICAN: 50
GLUCOSE BLD-MCNC: 81 MG/DL (ref 70–99)
HCT VFR BLD CALC: 34.5 % (ref 40.5–52.5)
HEMOGLOBIN: 11.3 G/DL (ref 13.5–17.5)
LYMPHOCYTES ABSOLUTE: 0.9 K/UL (ref 1–5.1)
LYMPHOCYTES RELATIVE PERCENT: 20.3 %
MAGNESIUM: 1.8 MG/DL (ref 1.8–2.4)
MCH RBC QN AUTO: 29.8 PG (ref 26–34)
MCHC RBC AUTO-ENTMCNC: 32.8 G/DL (ref 31–36)
MCV RBC AUTO: 91.1 FL (ref 80–100)
MONOCYTES ABSOLUTE: 0.7 K/UL (ref 0–1.3)
MONOCYTES RELATIVE PERCENT: 16 %
NEUTROPHILS ABSOLUTE: 2.7 K/UL (ref 1.7–7.7)
NEUTROPHILS RELATIVE PERCENT: 62.9 %
PDW BLD-RTO: 14.8 % (ref 12.4–15.4)
PHOSPHORUS: 3.1 MG/DL (ref 2.5–4.9)
PLATELET # BLD: 202 K/UL (ref 135–450)
PMV BLD AUTO: 7.4 FL (ref 5–10.5)
POTASSIUM SERPL-SCNC: 3.8 MMOL/L (ref 3.5–5.1)
RBC # BLD: 3.78 M/UL (ref 4.2–5.9)
SODIUM BLD-SCNC: 141 MMOL/L (ref 136–145)
WBC # BLD: 4.2 K/UL (ref 4–11)

## 2020-02-11 PROCEDURE — 2580000003 HC RX 258: Performed by: SURGERY

## 2020-02-11 PROCEDURE — 94640 AIRWAY INHALATION TREATMENT: CPT

## 2020-02-11 PROCEDURE — 85025 COMPLETE CBC W/AUTO DIFF WBC: CPT

## 2020-02-11 PROCEDURE — 82103 ALPHA-1-ANTITRYPSIN TOTAL: CPT

## 2020-02-11 PROCEDURE — 82104 ALPHA-1-ANTITRYPSIN PHENO: CPT

## 2020-02-11 PROCEDURE — 2700000000 HC OXYGEN THERAPY PER DAY

## 2020-02-11 PROCEDURE — 6370000000 HC RX 637 (ALT 250 FOR IP): Performed by: INTERNAL MEDICINE

## 2020-02-11 PROCEDURE — 36415 COLL VENOUS BLD VENIPUNCTURE: CPT

## 2020-02-11 PROCEDURE — 6370000000 HC RX 637 (ALT 250 FOR IP): Performed by: SURGERY

## 2020-02-11 PROCEDURE — G0378 HOSPITAL OBSERVATION PER HR: HCPCS

## 2020-02-11 PROCEDURE — 80069 RENAL FUNCTION PANEL: CPT

## 2020-02-11 PROCEDURE — 83735 ASSAY OF MAGNESIUM: CPT

## 2020-02-11 PROCEDURE — 99222 1ST HOSP IP/OBS MODERATE 55: CPT | Performed by: INTERNAL MEDICINE

## 2020-02-11 RX ORDER — PREDNISONE 20 MG/1
40 TABLET ORAL DAILY
Qty: 8 TABLET | Refills: 0 | Status: SHIPPED | OUTPATIENT
Start: 2020-02-11 | End: 2020-02-15

## 2020-02-11 RX ORDER — LEVALBUTEROL INHALATION SOLUTION 0.63 MG/3ML
0.63 SOLUTION RESPIRATORY (INHALATION) EVERY 8 HOURS PRN
Status: DISCONTINUED | OUTPATIENT
Start: 2020-02-11 | End: 2020-02-11 | Stop reason: HOSPADM

## 2020-02-11 RX ORDER — AZITHROMYCIN 250 MG/1
250 TABLET, FILM COATED ORAL DAILY
Status: DISCONTINUED | OUTPATIENT
Start: 2020-02-11 | End: 2020-02-11 | Stop reason: HOSPADM

## 2020-02-11 RX ORDER — LEVALBUTEROL INHALATION SOLUTION 0.63 MG/3ML
0.63 SOLUTION RESPIRATORY (INHALATION) EVERY 4 HOURS PRN
Qty: 3 VIAL | Refills: 3 | Status: SHIPPED | OUTPATIENT
Start: 2020-02-11 | End: 2020-05-01 | Stop reason: SDUPTHER

## 2020-02-11 RX ORDER — AZITHROMYCIN 250 MG/1
250 TABLET, FILM COATED ORAL DAILY
Qty: 4 TABLET | Refills: 0 | Status: SHIPPED | OUTPATIENT
Start: 2020-02-11 | End: 2020-02-15

## 2020-02-11 RX ORDER — ATORVASTATIN CALCIUM 40 MG/1
40 TABLET, FILM COATED ORAL NIGHTLY
Qty: 30 TABLET | Refills: 3 | Status: SHIPPED | OUTPATIENT
Start: 2020-02-11 | End: 2021-08-26 | Stop reason: SDUPTHER

## 2020-02-11 RX ORDER — HYDRALAZINE HYDROCHLORIDE 20 MG/ML
10 INJECTION INTRAMUSCULAR; INTRAVENOUS EVERY 6 HOURS PRN
Status: DISCONTINUED | OUTPATIENT
Start: 2020-02-11 | End: 2020-02-11 | Stop reason: HOSPADM

## 2020-02-11 RX ADMIN — Medication 2 PUFF: at 12:37

## 2020-02-11 RX ADMIN — NIFEDIPINE 90 MG: 30 TABLET, EXTENDED RELEASE ORAL at 09:30

## 2020-02-11 RX ADMIN — GUAIFENESIN 600 MG: 600 TABLET, EXTENDED RELEASE ORAL at 09:30

## 2020-02-11 RX ADMIN — Medication 10 ML: at 09:30

## 2020-02-11 RX ADMIN — TIOTROPIUM BROMIDE INHALATION SPRAY 1 PUFF: 3.12 SPRAY, METERED RESPIRATORY (INHALATION) at 08:38

## 2020-02-11 RX ADMIN — Medication 2 PUFF: at 08:37

## 2020-02-11 RX ADMIN — PREDNISONE 40 MG: 20 TABLET ORAL at 09:30

## 2020-02-11 RX ADMIN — AZITHROMYCIN MONOHYDRATE 250 MG: 250 TABLET ORAL at 09:30

## 2020-02-11 RX ADMIN — Medication 2 PUFF: at 04:44

## 2020-02-11 RX ADMIN — Medication 2 PUFF: at 15:44

## 2020-02-11 RX ADMIN — Medication 2 PUFF: at 00:32

## 2020-02-11 ASSESSMENT — PAIN SCALES - GENERAL
PAINLEVEL_OUTOF10: 0

## 2020-02-11 NOTE — PROGRESS NOTES
[facility]    Respiratory Therapy     Home Oxygen Evaluation        Name: Adelfo Du  Medical Record Number: [id number]  Age: 78 y.o. Gender:  male   : 1950  Today's date: 2020  Room: 6327/6327-01      Assessment        /66   Pulse 67   Temp 97.4 °F (36.3 °C) (Oral)   Resp 20   Ht 5' 7\" (1.702 m)   Wt 140 lb (63.5 kg)   SpO2 99%   BMI 21.93 kg/m²     Patient Active Problem List   Diagnosis    COPD (chronic obstructive pulmonary disease) (HCC)    Essential hypertension, benign    History of anemia    SOB (shortness of breath)    Umbilical hernia    Erectile dysfunction due to arterial insufficiency    Asthma    Chronic kidney disease    Emphysema of lung ([facility]    Essential hypertension    Hyperlipidemia    Anemia    COPD exacerbation ([facility]    Hypoxia       Social History:  Social History     Tobacco Use    Smoking status: Former Smoker     Packs/day: 0.50     Years: 45.00     Pack years: 22.50     Last attempt to quit: 10/21/2015     Years since quittin.3    Smokeless tobacco: Never Used   Substance Use Topics    Alcohol use: No     Alcohol/week: 0.0 standard drinks    Drug use: No       Patient Room Air saturation at rest 94  %  Patient Room Air saturation upon ambulation 86 %    Oxygen saturations of 88% or less on RA qualifies patient for Home Oxygen    Patient resting on 0  lmp  with an oxygen saturation of  94 %     Patient ambulated on 0 lpm with an oxygen saturation of 86%    Patient ambulated on 1 lpm with an oxygen saturation of 91%      Qualifying patient for home oxygen with ambulation and continuous flow  @ 1 lpm.      In your clinical assessment does the Patient Require Portable Oxygen Tanks?     Yes               Patient/caregiver was educated on Home Oxygen process:  Yes      Level of patient/caregiver understanding able to:   [x] Verbalize understanding   [] Demonstrate understanding       [] Teach back        [] Needs reinforcement        [] No available caregiver               []  Other:     Response to education:  Very Good     Time Spent with Home O2 Set Up:  5  minutes     Linwood Forde RCP on 2/11/2020 at 8:55 AM                 .

## 2020-02-11 NOTE — DISCHARGE SUMMARY
INTERNAL MEDICINE DEPARTMENT AT [address] [place] [address]  DISCHARGE SUMMARY    Patient ID: Darene Jeans                                             Discharge Date: 2/11/2020   Patient's PCP: Donna Mejia MD                                          Discharge Physician: Madison Garcia MD  Admit Date: 2/10/2020   Admitting Physician: Chris Cameron MD    PROBLEMS DURING HOSPITALIZATION:  Present on Admission:   COPD exacerbation ([address]   Hypoxia      DISCHARGE DIAGNOSES:  1. COPD with acute exacerbation    HPI: 78 yom PMH COPD, CKD, HTN, HLD sent to ED by Dr. Luca Roman due to hypoxia during 6 minute walk test at the conclusion of his PFTs. PFT lab called to inform of dyspnea with exertion with an O2 sat of 86% on room air. Patient reports that he received a nebulizer treatment in the PFT lab which made him feel SOB, anxious, flushed, and have diarrhea. He states that every time he receives nebulizer treatments he experiences these symptoms. He prefers to only use inhalers because of this. Dr. Luca Roman started patient on daily prednisone 40mg po and requested admit to observation. The following issues were addressed during hospitalization:  COPD exacerbation    Hospital course:  Patient admitted to observation and started on daily prednisone 40 and daily azithromycin 500 x 1 followed by 4 days of 250mg on discharge. Patient was intolerant to nebulizer, potentially due to aerosolized saline stating he became flushed and felt congested, so albuterol and tiotropium were administered q4hrs while awake through MDI with spacer. On day of discharge patient SaO2 was mid 90s on room air and he endorsed improvement in symptoms. Home O2 evaluation determine no need for home oxygen. Physical Exam:  BP (!) 136/95   Pulse 74   Temp 96.6 °F (35.9 °C) (Oral)   Resp 20   Ht 5' 7\" (1.702 m)   Wt 140 lb (63.5 kg)   SpO2 98%   BMI 21.93 kg/m²     Constitutional:       General: He is not in acute distress.      Appearance:

## 2020-02-12 ENCOUNTER — TELEPHONE (OUTPATIENT)
Dept: INTERNAL MEDICINE CLINIC | Age: 70
End: 2020-02-12

## 2020-02-13 NOTE — TELEPHONE ENCOUNTER
[facility] Transitions Initial Follow Up Call    Outreach made within 2 business days of discharge: Yes    Patient: Isaak Hamlin Patient : 1950   MRN: <[id number]>  Reason for Admission: There are no discharge diagnoses documented for the most recent discharge. Discharge Date: 20       Spoke with: Patient    Discharge department/facility:[facility] Interactive Patient Contact:  Was patient able to fill all prescriptions: Yes  Was patient instructed to bring all medications to the follow-up visit: Yes  Is patient taking all medications as directed in the discharge summary?  Yes  Does patient understand their discharge instructions: Yes  Does patient have questions or concerns that need addressed prior to 7-14 day follow up office visit: No    Scheduled appointment with PCP within 7-14 days    Follow Up  Future Appointments   Date Time Provider Jayshree Ying   2020 10:00 AM Anaya Ba MD [facility] MISBAH Oleary MA

## 2020-02-15 LAB
ALPHA-1 ANTITRYPSIN PHENOTYPE: NORMAL
ALPHA-1 ANTITRYPSIN: 128 MG/DL (ref 90–200)

## 2020-02-17 ENCOUNTER — OFFICE VISIT (OUTPATIENT)
Dept: PULMONOLOGY | Age: 70
End: 2020-02-17
Payer: MEDICARE

## 2020-02-17 VITALS
DIASTOLIC BLOOD PRESSURE: 96 MMHG | BODY MASS INDEX: 22.66 KG/M2 | HEIGHT: 66 IN | SYSTOLIC BLOOD PRESSURE: 150 MMHG | HEART RATE: 86 BPM | RESPIRATION RATE: 16 BRPM | WEIGHT: 141 LBS | OXYGEN SATURATION: 93 %

## 2020-02-17 PROCEDURE — 1123F ACP DISCUSS/DSCN MKR DOCD: CPT | Performed by: INTERNAL MEDICINE

## 2020-02-17 PROCEDURE — 4040F PNEUMOC VAC/ADMIN/RCVD: CPT | Performed by: INTERNAL MEDICINE

## 2020-02-17 PROCEDURE — 1036F TOBACCO NON-USER: CPT | Performed by: INTERNAL MEDICINE

## 2020-02-17 PROCEDURE — 99214 OFFICE O/P EST MOD 30 MIN: CPT | Performed by: INTERNAL MEDICINE

## 2020-02-17 PROCEDURE — G8427 DOCREV CUR MEDS BY ELIG CLIN: HCPCS | Performed by: INTERNAL MEDICINE

## 2020-02-17 PROCEDURE — G8420 CALC BMI NORM PARAMETERS: HCPCS | Performed by: INTERNAL MEDICINE

## 2020-02-17 PROCEDURE — 3017F COLORECTAL CA SCREEN DOC REV: CPT | Performed by: INTERNAL MEDICINE

## 2020-02-17 PROCEDURE — G8482 FLU IMMUNIZE ORDER/ADMIN: HCPCS | Performed by: INTERNAL MEDICINE

## 2020-02-17 PROCEDURE — 3023F SPIROM DOC REV: CPT | Performed by: INTERNAL MEDICINE

## 2020-02-17 PROCEDURE — 1111F DSCHRG MED/CURRENT MED MERGE: CPT | Performed by: INTERNAL MEDICINE

## 2020-02-17 PROCEDURE — G8926 SPIRO NO PERF OR DOC: HCPCS | Performed by: INTERNAL MEDICINE

## 2020-02-17 NOTE — PROGRESS NOTES
too much around his house room to room. He has a mild cough with occasional clear phlegm but no hemoptysis. Occasionally has some chest tightness and wheezing. No fevers chills anorexia or weight loss. No peripheral edema or orthopnea. Past Medical History:    Past Medical History:   Diagnosis Date    Anemia     Asthma     Chronic kidney disease     COPD (chronic obstructive pulmonary disease) (HCC)     severe    Essential hypertension     Hyperlipidemia        Social History:    Patient is a retired . He smoked half a pack of cigarettes a day for 45 years and quit in 2015.     Family History:  Asthma  TB    Current Medications:  Current Outpatient Medications on File Prior to Visit   Medication Sig Dispense Refill    Spacer/Aero-Holding Chambers LILIANE 1 Device by Does not apply route daily 1 Device 0    tiotropium (SPIRIVA RESPIMAT) 2.5 MCG/ACT AERS inhaler Inhale 1 puff into the lungs daily 1 Inhaler 2    guaiFENesin (MUCINEX) 600 MG extended release tablet Take 600 mg by mouth daily      budesonide-formoterol (SYMBICORT) 160-4.5 MCG/ACT AERO Inhale 2 puffs into the lungs 2 times daily 1 Inhaler 5    tiotropium (SPIRIVA RESPIMAT) 1.25 MCG/ACT AERS inhaler Inhale 2 puffs into the lungs daily 1 Inhaler 5    NIFEdipine (ADALAT CC) 90 MG extended release tablet TAKE 1 TABLET BY MOUTH DAILY 90 tablet 1    Respiratory Therapy Supplies (NEBULIZER) LILIANE 1 applicator by Does not apply route 4 times daily as needed (SOB/wheezing) 1 Device 0    Multiple Vitamins-Minerals (THERAPEUTIC MULTIVITAMIN-MINERALS) tablet Take 1 tablet by mouth daily      atorvastatin (LIPITOR) 40 MG tablet Take 1 tablet by mouth nightly (Patient not taking: Reported on 2/17/2020) 30 tablet 3    levalbuterol (XOPENEX) 0.63 MG/3ML nebulization Take 3 mLs by nebulization every 4 hours as needed for Wheezing or Shortness of Breath (Patient not taking: Reported on 2/17/2020) 3 vial 3     No current facility-administered medications on file prior to visit. REVIEW OF SYSTEMS:    CONSTITUTIONAL: Negative for fevers and chills  HEENT: Negative for oropharyngeal exudate, post nasal drip, sinus pain / pressure, nasal congestion, ear pain  RESPIRATORY:  See HPI  CARDIOVASCULAR: Negative for chest pain, palpitations, edema  GASTROINTESTINAL: Negative for nausea, vomiting, diarrhea, constipation and abdominal pain  GENITOURINARY: Negative for dysuria, urinary frequency, urinary hesitancy  HEMATOLOGICAL: Negative for adenopathy  SKIN: Negative for clubbing, cyanosis, skin lesions  ENDOCRINE: Negative for polyuria, polydipsia, heat intolerance, cold intolerance   EXTREMITIES: Negative for weakness or decreased ROM in all extremities  NEUROLOGICAL: Negative for unilateral weakness, speech or gait abnormalities    Objective:   PHYSICAL EXAM:        VITALS:  BP (!) 150/96 (Site: Right Upper Arm, Position: Sitting, Cuff Size: Medium Adult)   Pulse 86   Resp 16   Ht 5' 6\" (1.676 m)   Wt 141 lb (64 kg)   SpO2 93%   BMI 22.76 kg/m²     CONSTITUTIONAL:  Awake, alert, cooperative, no apparent distress, and appears stated age  HEENT: No oropharyngeal exudate, PERRL, no cervical adenopathy, no tracheal deviation, thyroid size normal  LUNGS:  No increased work of breathing and clear to auscultation, no crackles or wheezing  CARDIOVASCULAR:  normal S1 and S2 and no JVD  ABDOMEN:  Normal bowel sounds, non-distended and non-tender to palpation  EXT: No edema, no calf tenderness. Pulses are present bilaterally. NEUROLOGIC:  Mental Status Exam:  Level of Alertness:   awake  Orientation:   person, place, time. SKIN:  normal skin color, texture, turgor, no redness, warmth, or swelling     DATA:      A1AT 164    Radiology Review:  Pertinent images / reports were reviewed as a part of this visit. CT Chest 2/10/2020  FINDINGS:       Lungs and Pleura: Severe emphysema with panlobular changes in the upper lobes.  Centrilobular changes in the lower lobes. No pleural effusion. No significant consolidation. Calcified granuloma in the left lower lung.       Mediastinum: No paratracheal lymphadenopathy. Large calcified subcarinal lymph node compatible with incidental calcified granuloma. Main pulmonary artery is upper limits of normal measuring 3 cm. No hilar lymphadenopathy identified. No pericardial    effusion. Normal heart size.       Lower neck and chest wall: No axillary lymphadenopathy. No supraclavicular lymphadenopathy.       Upper Abdomen: No discrete liver abnormality. Spleen is unremarkable. No hydronephrosis visualized.       Bones: Mild degenerative disease. No acute osseous abnormality.           Impression       1. Severe emphysema with panlobular changes in the lung apices. 2. No acute cardiopulmonary abnormality.         CT Chest [facility] March 6, 2018 reveals the following:  IMPRESSION:    1. Severe emphysema. 2. No evidence of any acute pulmonary disease. 3. No evidence of any pulmonary neoplasm. PFTs:  Pulmonary Function Test: Fen 10. 2020     Indication:        Shortness of breath                          Wheezing                          Cough                          Smoked cigarettes for 41 years     Test comment:                           Patient stated that he was short of breath during testing. He was unable to complete post PFT due to shortness of breath                          He was transported to the ER                          Pulse ox is 92 % on room air                          Estimated body mass index is 21.93 kg/m² as calculated from the following:    Height as of an earlier encounter on 2/10/20: 5' 7\" (1.702 m). Weight as of an earlier encounter on 2/10/20: 140 lb (63.5 kg).      Spirometry data:                          FEV1/FVC: 31. Predicted ratio 76                          FEV1 0.46 L, which is 18 % predicted                           FVC is 1.48 L, which is 43 % predicted     Lung Volumes:                          TLC (by Plethysmography) is 9.18 L, which is 143 % predicted                          RV is 7.97 L which is 348 % predicted     Diffusion Capacity:                          DLCO is 7.07 which is 24 % predicted     Impression:                          1. There is very severe obstruction present                          2. There is  significant hyperinflation / air trapping                          3. There is severe reduction in diffusion capacity     Comment:   Test findings are likely not accurate due to poor effort and shortness of breath he had during testing  Consider repeating PFT when he reaches a baseline       Date: 10/9/2013 10:23:35 XA                         SPIROMETRY                  Units    Pred  PreDrug  %Pred  PostDrug  %Pred  %Chg  FVC         L,btps       3.36     1.96     18      9.62     74    76  FEV1        L,btps       2.57     0.66     21      3.72     95    66  FEV1/FVC    %           78.       39.       43     35.       45     5  FEV3        L,btps       3.26     1. 14     76      4.63     03    75  KJD65-80%   L/s          2.37     0.20      8      0.31     92    69  FEFmax      L/s          7.29     1.52     25      8.57     21    -1  FIFmax      L/s                   2.49             1.33          -13  TET         T                    34.87            25.72           -9    PLETHYSMOGRAPHY                     Units    Pred  PreDrug  %Pred   TLC            L,btps       5.52    12.01    217   FRC            L,btps       3.42    10.58    309   RV             L,btps       2.17     3.63    628   ERV            L,btps       1.25     0.84     67   RV/TLC         %           39.      81.      207   VTG            L,btps               10.57          VC             L,btps       3.36     2.27     68   RAW            cmH2O/L/s    0.20    11.41            ECHO 2/10/2020   Summary   Left ventricular cavity size is normal with mild concentric left ventricular   hypertrophy. Left ventricular function is normal with ejection fraction estimated at   50-55%. Indeterminate diastolic function. Slight thickening of leaflets of mitral valve. Mild mitral regurgitation   Aortic valve appears thickened/calcified but opens adequately. Mild tricuspid regurgitation. Estimated pulmonary artery systolic pressure is at 35 mmHg assuming a right   atrial pressure of 3 mmHg. Stress echo October 25, 2013 [facility]  FINAL INTERPRETATION  Abnormal study consistent with a small area of ischemia. Overall study quality is excellent. Scan significance indicates low to moderate cardiac risk. There is right ventricular dilation. There is septal flattening at stress consistent with elevated   right heart pressures. Left Heart Catheterization (11/25/13)    \"ANGIOGRAPHIC FINDINGS:  1. Left main coronary artery was free of any angiographically significant disease. It gave of the left anterior descending artery and the left circumflex artery. 2. The left anterior descending had a normal anatomic course. It gave off multiple diagonals. It was free of any angiographically significant disease. 3. The left circumflex artery gives off a large obtuse marginal and it was free of any angiographically significant disease. 4. The right coronary artery gave rise to posterior descending artery and posterolateral branches distally, making it a co-dominant vessel. It was free of any angiographically significant disease. HEMODYNAMICS:   LVEDP: 22   No gradient across the aortic valve   CONCLUSION:   No evidence of coronary artery disease. \"      Assessment:      Diagnosis Orders   1. COPD, very severe ([address]     2. SEGAL (dyspnea on exertion)         Plan:   1. Continue Symbicort, Spiriva, home oxygen and as needed albuterol/Xopenex  2. Pt to start pulmonary rehab. 3.  Patient quit smoking in 2015. He is up-to-date with Prevnar 13, Pneumovax, and influenza vaccinations  4.  Return

## 2020-05-01 RX ORDER — LEVALBUTEROL INHALATION SOLUTION 0.63 MG/3ML
0.63 SOLUTION RESPIRATORY (INHALATION) EVERY 4 HOURS PRN
Qty: 3 VIAL | Refills: 3 | Status: SHIPPED | OUTPATIENT
Start: 2020-05-01

## 2020-07-02 NOTE — TELEPHONE ENCOUNTER
Last appointment: 12/19/2019  Next appointment: Visit date not found  Last refill: 12/19/2019  # 1 with 5 refills

## 2020-07-03 RX ORDER — TIOTROPIUM BROMIDE INHALATION SPRAY 1.56 UG/1
SPRAY, METERED RESPIRATORY (INHALATION)
Qty: 4 G | Refills: 5 | Status: SHIPPED | OUTPATIENT
Start: 2020-07-03 | End: 2020-11-10 | Stop reason: SDUPTHER

## 2020-09-29 RX ORDER — BUDESONIDE AND FORMOTEROL FUMARATE DIHYDRATE 160; 4.5 UG/1; UG/1
AEROSOL RESPIRATORY (INHALATION)
Qty: 10.2 G | Refills: 0 | Status: SHIPPED | OUTPATIENT
Start: 2020-09-29 | End: 2020-09-29 | Stop reason: SDUPTHER

## 2020-09-29 NOTE — TELEPHONE ENCOUNTER
Patient called back and realized he is wanting a re-fill on Symbicort that was prescribed by Dr. John Gonzalez. He seems to think it works better than Spiriva. I informed him he needs to call  Dr. Adam Hendrix office to re-fill.

## 2020-09-29 NOTE — TELEPHONE ENCOUNTER
Refilled times one. He is overdue follow up with me and Pulmonary. Needs to make appts. Video visits may be possible if he has concerns about Covid.

## 2020-09-30 RX ORDER — BUDESONIDE AND FORMOTEROL FUMARATE DIHYDRATE 160; 4.5 UG/1; UG/1
AEROSOL RESPIRATORY (INHALATION)
Qty: 10.2 G | Refills: 11 | Status: SHIPPED | OUTPATIENT
Start: 2020-09-30 | End: 2020-10-21 | Stop reason: SDUPTHER

## 2020-10-21 RX ORDER — BUDESONIDE AND FORMOTEROL FUMARATE DIHYDRATE 160; 4.5 UG/1; UG/1
AEROSOL RESPIRATORY (INHALATION)
Qty: 10.2 G | Refills: 11 | Status: SHIPPED | OUTPATIENT
Start: 2020-10-21 | End: 2021-08-26 | Stop reason: SDUPTHER

## 2020-10-21 RX ORDER — ALBUTEROL SULFATE 90 UG/1
2 AEROSOL, METERED RESPIRATORY (INHALATION) EVERY 6 HOURS PRN
Qty: 8.5 G | OUTPATIENT
Start: 2020-10-21

## 2020-10-21 NOTE — TELEPHONE ENCOUNTER
----- Message from Peewee Lei sent at 10/21/2020 11:30 AM EDT -----  Subject: Message to Provider    QUESTIONS  Information for Provider? patient is requesting if Reza Martin can call him he   had just spoke with her and patient has some additional information for   Wendy .   ---------------------------------------------------------------------------  --------------  [address]  What is the best way for the office to contact you? OK to leave message on   voicemail  Preferred Call Back Phone Number? [phone]  ---------------------------------------------------------------------------  --------------  SCRIPT ANSWERS  Relationship to Patient?  Self

## 2020-10-21 NOTE — TELEPHONE ENCOUNTER
Last appointment: 12/19/2019  Next appointment: 11/6/2020  Last refill:    Patient requesting refill on ProAir inhaler, brand name only as generic does not work for him.   Not on current med list.

## 2020-11-06 ENCOUNTER — TELEPHONE (OUTPATIENT)
Dept: INTERNAL MEDICINE CLINIC | Age: 70
End: 2020-11-06

## 2020-11-06 NOTE — TELEPHONE ENCOUNTER
----- Message from [facility] sent at 11/6/2020  8:06 AM EST -----  Subject: Message to Provider    QUESTIONS  Information for Provider? Pt was on his way to the appt this morning   and wasnt able to make it because his COPD was acting up  ---------------------------------------------------------------------------  --------------  CALL BACK INFO  What is the best way for the office to contact you? OK to leave message on   voicemail  Preferred Call Back Phone Number? [phone]  ---------------------------------------------------------------------------  --------------  SCRIPT ANSWERS  Relationship to Patient?  Self

## 2020-11-10 ENCOUNTER — OFFICE VISIT (OUTPATIENT)
Dept: PULMONOLOGY | Age: 70
End: 2020-11-10
Payer: MEDICARE

## 2020-11-10 VITALS — OXYGEN SATURATION: 92 % | HEART RATE: 97 BPM | TEMPERATURE: 97.5 F

## 2020-11-10 PROCEDURE — G8427 DOCREV CUR MEDS BY ELIG CLIN: HCPCS | Performed by: INTERNAL MEDICINE

## 2020-11-10 PROCEDURE — 1123F ACP DISCUSS/DSCN MKR DOCD: CPT | Performed by: INTERNAL MEDICINE

## 2020-11-10 PROCEDURE — G0008 ADMIN INFLUENZA VIRUS VAC: HCPCS | Performed by: INTERNAL MEDICINE

## 2020-11-10 PROCEDURE — 4040F PNEUMOC VAC/ADMIN/RCVD: CPT | Performed by: INTERNAL MEDICINE

## 2020-11-10 PROCEDURE — G8484 FLU IMMUNIZE NO ADMIN: HCPCS | Performed by: INTERNAL MEDICINE

## 2020-11-10 PROCEDURE — 3017F COLORECTAL CA SCREEN DOC REV: CPT | Performed by: INTERNAL MEDICINE

## 2020-11-10 PROCEDURE — 90694 VACC AIIV4 NO PRSRV 0.5ML IM: CPT | Performed by: INTERNAL MEDICINE

## 2020-11-10 PROCEDURE — G8926 SPIRO NO PERF OR DOC: HCPCS | Performed by: INTERNAL MEDICINE

## 2020-11-10 PROCEDURE — 3023F SPIROM DOC REV: CPT | Performed by: INTERNAL MEDICINE

## 2020-11-10 PROCEDURE — G8420 CALC BMI NORM PARAMETERS: HCPCS | Performed by: INTERNAL MEDICINE

## 2020-11-10 PROCEDURE — 99214 OFFICE O/P EST MOD 30 MIN: CPT | Performed by: INTERNAL MEDICINE

## 2020-11-10 PROCEDURE — 1036F TOBACCO NON-USER: CPT | Performed by: INTERNAL MEDICINE

## 2020-11-10 RX ORDER — TIOTROPIUM BROMIDE 18 UG/1
18 CAPSULE ORAL; RESPIRATORY (INHALATION) DAILY
Qty: 30 CAPSULE | Refills: 11 | Status: SHIPPED | OUTPATIENT
Start: 2020-11-10 | End: 2021-08-26 | Stop reason: SDUPTHER

## 2020-11-10 RX ORDER — FLUTICASONE PROPIONATE 50 MCG
2 SPRAY, SUSPENSION (ML) NASAL DAILY
Qty: 1 BOTTLE | Refills: 11 | Status: SHIPPED | OUTPATIENT
Start: 2020-11-10 | End: 2021-08-26 | Stop reason: SDUPTHER

## 2020-11-10 NOTE — PROGRESS NOTES
home oxygen and has not participated in pulmonary rehab. His regimen includes Symbicort, Spiriva, and albuterol, which she does use daily. He had a CT chest in March 2018 that showed severe apical predominant emphysema. Alpha-1 antitrypsin done in December was 164. He had a stress echo back in 2013 that showed a reversible ischemic defect with elevated pulmonary pressures. He had a left heart catheterization after that that showed no CAD. Patient likes to ride a stationary bicycle but unfortunately can only do that for about 2 minutes at present due to dyspnea. He also gets short of breath and a hot shower and with steps. He will get short of breath if he walks too much around his house room to room. He has a mild cough with occasional clear phlegm but no hemoptysis. Occasionally has some chest tightness and wheezing. No fevers chills anorexia or weight loss. No peripheral edema or orthopnea. Past Medical History:    Past Medical History:   Diagnosis Date    Anemia     Asthma     Chronic kidney disease     COPD (chronic obstructive pulmonary disease) (HCC)     severe    Essential hypertension     Hyperlipidemia        Social History:    Patient is a retired . He smoked half a pack of cigarettes a day for 45 years and quit in 2015.     Family History:  Asthma  TB    Current Medications:  Current Outpatient Medications on File Prior to Visit   Medication Sig Dispense Refill    budesonide-formoterol (SYMBICORT) 160-4.5 MCG/ACT AERO INHALE 2 PUFFS INTO THE LUNGS TWICE DAILY 10.2 g 11    PROAIR  (90 Base) MCG/ACT inhaler Inhale 2 puffs into the lungs every 6 hours as needed for Wheezing Patient requests \"Proair\" brand name only 1 Inhaler 3    levalbuterol (XOPENEX) 0.63 MG/3ML nebulization Take 3 mLs by nebulization every 4 hours as needed for Wheezing or Shortness of Breath 3 vial 3    atorvastatin (LIPITOR) 40 MG tablet Take 1 tablet by mouth nightly (Patient not taking: Reported on 2/17/2020) 30 tablet 3    Spacer/Aero-Holding Chambers LILIANE 1 Device by Does not apply route daily 1 Device 0    guaiFENesin (MUCINEX) 600 MG extended release tablet Take 600 mg by mouth daily      NIFEdipine (ADALAT CC) 90 MG extended release tablet TAKE 1 TABLET BY MOUTH DAILY 90 tablet 1    Respiratory Therapy Supplies (NEBULIZER) LILIANE 1 applicator by Does not apply route 4 times daily as needed (SOB/wheezing) 1 Device 0    Multiple Vitamins-Minerals (THERAPEUTIC MULTIVITAMIN-MINERALS) tablet Take 1 tablet by mouth daily       No current facility-administered medications on file prior to visit. REVIEW OF SYSTEMS:    CONSTITUTIONAL: Negative for fevers and chills  HEENT: Negative for oropharyngeal exudate, post nasal drip, sinus pain / pressure, nasal congestion, ear pain  RESPIRATORY:  See HPI  CARDIOVASCULAR: Negative for chest pain, palpitations, edema  GASTROINTESTINAL: Negative for nausea, vomiting, diarrhea, constipation and abdominal pain  GENITOURINARY: Negative for dysuria, urinary frequency, urinary hesitancy  HEMATOLOGICAL: Negative for adenopathy  SKIN: Negative for clubbing, cyanosis, skin lesions  ENDOCRINE: Negative for polyuria, polydipsia, heat intolerance, cold intolerance   EXTREMITIES: Negative for weakness or decreased ROM in all extremities  NEUROLOGICAL: Negative for unilateral weakness, speech or gait abnormalities    Objective:   PHYSICAL EXAM:        VITALS:  Pulse 97   Temp 97.5 °F (36.4 °C) (Infrared)   SpO2 92%     CONSTITUTIONAL:  Awake, alert, cooperative, no apparent distress, and appears stated age  HEENT: No oropharyngeal exudate, PERRL, no cervical adenopathy, no tracheal deviation, thyroid size normal  LUNGS:  No increased work of breathing and clear to auscultation, no crackles or wheezing  CARDIOVASCULAR:  normal S1 and S2 and no JVD  ABDOMEN:  Normal bowel sounds, non-distended and non-tender to palpation  EXT: No edema, no calf tenderness. Pulses are present bilaterally. NEUROLOGIC:  Mental Status Exam:  Level of Alertness:   awake  Orientation:   person, place, time. SKIN:  normal skin color, texture, turgor, no redness, warmth, or swelling     DATA:      A1AT 164    Radiology Review:  Pertinent images / reports were reviewed as a part of this visit. CT Chest 2/10/2020  FINDINGS:       Lungs and Pleura: Severe emphysema with panlobular changes in the upper lobes. Centrilobular changes in the lower lobes. No pleural effusion. No significant consolidation. Calcified granuloma in the left lower lung.       Mediastinum: No paratracheal lymphadenopathy. Large calcified subcarinal lymph node compatible with incidental calcified granuloma. Main pulmonary artery is upper limits of normal measuring 3 cm. No hilar lymphadenopathy identified. No pericardial    effusion. Normal heart size.       Lower neck and chest wall: No axillary lymphadenopathy. No supraclavicular lymphadenopathy.       Upper Abdomen: No discrete liver abnormality. Spleen is unremarkable. No hydronephrosis visualized.       Bones: Mild degenerative disease. No acute osseous abnormality.           Impression       1. Severe emphysema with panlobular changes in the lung apices. 2. No acute cardiopulmonary abnormality.         CT Chest [facility] March 6, 2018 reveals the following:  IMPRESSION:    1. Severe emphysema. 2. No evidence of any acute pulmonary disease. 3. No evidence of any pulmonary neoplasm. PFTs:  Pulmonary Function Test: Fen 10. 2020     Indication:        Shortness of breath                          Wheezing                          Cough                          Smoked cigarettes for 41 years     Test comment:                           Patient stated that he was short of breath during testing.                           He was unable to complete post PFT due to shortness of breath                          He was transported to the ER                          Pulse ox is 92 % on room air                          Estimated body mass index is 21.93 kg/m² as calculated from the following:    Height as of an earlier encounter on 2/10/20: 5' 7\" (1.702 m). Weight as of an earlier encounter on 2/10/20: 140 lb (63.5 kg).    Spirometry data:                          FEV1/FVC: 31. Predicted ratio 76                          FEV1 0.46 L, which is 18 % predicted                           FVC is 1.48 L, which is 43 % predicted     Lung Volumes:                          TLC (by Plethysmography) is 9.18 L, which is 143 % predicted                          RV is 7.97 L which is 348 % predicted     Diffusion Capacity:                          DLCO is 7.07 which is 24 % predicted     Impression:                          1. There is very severe obstruction present                          2. There is  significant hyperinflation / air trapping                          3. There is severe reduction in diffusion capacity     Comment:   Test findings are likely not accurate due to poor effort and shortness of breath he had during testing  Consider repeating PFT when he reaches a baseline       Date: 10/9/2013 10:23:35 CT                         SPIROMETRY                  Units    Pred  PreDrug  %Pred  PostDrug  %Pred  %Chg  FVC         L,btps       3.36     1.96     93      5.35     45    76  FEV1        L,btps       2.57     0.66     53      0.48     78    36  FEV1/FVC    %           78.       62.       43     35.       45     5  FEV3        L,btps       3.26     1. 14     17      0.67     05    14  NRV60-06%   L/s          2.37     0.20      8      0.31     02    04  FEFmax      L/s          7.29     1.52     85      8.68     21    -1  FIFmax      L/s                   2.49             5.86          -68  TET         B                    71.13            74.40           -9    PLETHYSMOGRAPHY                     Units    Pred  PreDrug  %Pred   TLC            L,btps       5.52    12.01    [address] 5.40    25.29    644   DX             L,btps       2.17     4.85    684   ERV            L,btps       1.25     0.84     67   RV/TLC         %           39.      81.      207   VTG            L,btps               10.57          VC             L,btps       3.36     2.27     68   RAW            cmH2O/L/s    0.20    11.41            ECHO 2/10/2020   Summary   Left ventricular cavity size is normal with mild concentric left ventricular   hypertrophy. Left ventricular function is normal with ejection fraction estimated at   50-55%. Indeterminate diastolic function. Slight thickening of leaflets of mitral valve. Mild mitral regurgitation   Aortic valve appears thickened/calcified but opens adequately. Mild tricuspid regurgitation. Estimated pulmonary artery systolic pressure is at 35 mmHg assuming a right   atrial pressure of 3 mmHg. Stress echo October 25, 2013 [facility]  FINAL INTERPRETATION  Abnormal study consistent with a small area of ischemia. Overall study quality is excellent. Scan significance indicates low to moderate cardiac risk. There is right ventricular dilation. There is septal flattening at stress consistent with elevated   right heart pressures. Left Heart Catheterization (11/25/13)    \"ANGIOGRAPHIC FINDINGS:  1. Left main coronary artery was free of any angiographically significant disease. It gave of the left anterior descending artery and the left circumflex artery. 2. The left anterior descending had a normal anatomic course. It gave off multiple diagonals. It was free of any angiographically significant disease. 3. The left circumflex artery gives off a large obtuse marginal and it was free of any angiographically significant disease. 4. The right coronary artery gave rise to posterior descending artery and posterolateral branches distally, making it a co-dominant vessel. It was free of any angiographically significant disease.    HEMODYNAMICS: LVEDP: 22   No gradient across the aortic valve   CONCLUSION:   No evidence of coronary artery disease. \"      Assessment:      Diagnosis Orders   1. COPD, very severe ([address]     2. History of tobacco use         Plan:   1. Continue Symbicort, Spiriva, home oxygen and as needed albuterol/Xopenex  2. We will change Spiriva Respimat to HandiHaler  3. Patient quit smoking in 2015. He is up-to-date with Prevnar 13, Pneumovax. Will give influenza vaccination today  4. Start Flonase for nasal rhinorrhea  5.  Return for reevaluation in 4 months or sooner if needed

## 2020-11-10 NOTE — PROGRESS NOTES
Vaccine Information Sheet, \"Influenza - Inactivated\"  given to Francisco Javier Schmidt, or parent/legal guardian of  Francisco Javier Schmidt and verbalized understanding. Patient responses:    Have you ever had a reaction to a flu vaccine? No  Do you have any current illness? No  Have you ever had [place] Syndrome? No  Do you have a serious allergy to any of the follow: Neomycin, Polymyxin, Thimerosal, eggs or egg products? No    Flu vaccine given per order. Please see immunization tab. Risks and benefits explained. Current VIS given.

## 2020-12-07 ENCOUNTER — VIRTUAL VISIT (OUTPATIENT)
Dept: INTERNAL MEDICINE CLINIC | Age: 70
End: 2020-12-07
Payer: MEDICARE

## 2020-12-07 ENCOUNTER — TELEPHONE (OUTPATIENT)
Dept: INTERNAL MEDICINE CLINIC | Age: 70
End: 2020-12-07

## 2020-12-07 PROCEDURE — 3017F COLORECTAL CA SCREEN DOC REV: CPT | Performed by: INTERNAL MEDICINE

## 2020-12-07 PROCEDURE — G8484 FLU IMMUNIZE NO ADMIN: HCPCS | Performed by: INTERNAL MEDICINE

## 2020-12-07 PROCEDURE — 4040F PNEUMOC VAC/ADMIN/RCVD: CPT | Performed by: INTERNAL MEDICINE

## 2020-12-07 PROCEDURE — 1036F TOBACCO NON-USER: CPT | Performed by: INTERNAL MEDICINE

## 2020-12-07 PROCEDURE — G8420 CALC BMI NORM PARAMETERS: HCPCS | Performed by: INTERNAL MEDICINE

## 2020-12-07 PROCEDURE — G0438 PPPS, INITIAL VISIT: HCPCS | Performed by: INTERNAL MEDICINE

## 2020-12-07 PROCEDURE — G8427 DOCREV CUR MEDS BY ELIG CLIN: HCPCS | Performed by: INTERNAL MEDICINE

## 2020-12-07 PROCEDURE — 1123F ACP DISCUSS/DSCN MKR DOCD: CPT | Performed by: INTERNAL MEDICINE

## 2020-12-07 PROCEDURE — 99214 OFFICE O/P EST MOD 30 MIN: CPT | Performed by: INTERNAL MEDICINE

## 2020-12-07 ASSESSMENT — PATIENT HEALTH QUESTIONNAIRE - PHQ9
SUM OF ALL RESPONSES TO PHQ QUESTIONS 1-9: 0
SUM OF ALL RESPONSES TO PHQ QUESTIONS 1-9: 0
SUM OF ALL RESPONSES TO PHQ9 QUESTIONS 1 & 2: 0
2. FEELING DOWN, DEPRESSED OR HOPELESS: 0
SUM OF ALL RESPONSES TO PHQ QUESTIONS 1-9: 0
1. LITTLE INTEREST OR PLEASURE IN DOING THINGS: 0

## 2020-12-07 ASSESSMENT — LIFESTYLE VARIABLES: HOW OFTEN DO YOU HAVE A DRINK CONTAINING ALCOHOL: 0

## 2020-12-07 NOTE — PATIENT INSTRUCTIONS
Labs  Shashank Huerta bp cuff    See eye doctor    Get AAA ultrasound  [phone]    Do FIT test and Living Will     Personalized Preventive Plan for Misty Petit - 12/7/2020  Medicare offers a range of preventive health benefits. Some of the tests and screenings are paid in full while other may be subject to a deductible, co-insurance, and/or copay. Some of these benefits include a comprehensive review of your medical history including lifestyle, illnesses that may run in your family, and various assessments and screenings as appropriate. After reviewing your medical record and screening and assessments performed today your provider may have ordered immunizations, labs, imaging, and/or referrals for you. A list of these orders (if applicable) as well as your Preventive Care list are included within your After Visit Summary for your review. Other Preventive Recommendations:    · A preventive eye exam performed by an eye specialist is recommended every 1-2 years to screen for glaucoma; cataracts, macular degeneration, and other eye disorders. · A preventive dental visit is recommended every 6 months. · Try to get at least 150 minutes of exercise per week or 10,000 steps per day on a pedometer . · Order or download the FREE \"Exercise & Physical Activity: Your Everyday Guide\" from [employer]. Call 0-615.606.9754 or search [employer] online. · You need 7709-3185 mg of calcium and 6102-8657 IU of vitamin D per day. It is possible to meet your calcium requirement with diet alone, but a vitamin D supplement is usually necessary to meet this goal.  · When exposed to the sun, use a sunscreen that protects against both UVA and UVB radiation with an SPF of 30 or greater. Reapply every 2 to 3 hours or after sweating, drying off with a towel, or swimming. · Always wear a seat belt when traveling in a car. Always wear a helmet when riding a bicycle or motorcycle.

## 2020-12-07 NOTE — PROGRESS NOTES
Medicare Annual Wellness Visit  Name: Roman Miranda Date: 2020   MRN: <[id number]> Sex: Male   Age: 69 y.o. Ethnicity: Non-/Non    : 1950 Race: Black      Francisco Javier Schmidt is here for Medicare AWV    Screenings for behavioral, psychosocial and functional/safety risks, and cognitive dysfunction are all negative except as indicated below. These results, as well as other patient data from the [address] form, are documented in Flowsheets linked to this Encounter. Allergies   Allergen Reactions    Codeine Hives         Prior to Visit Medications    Medication Sig Taking?  Authorizing Provider   tiotropium (SPIRIVA HANDIHALER) 18 MCG inhalation capsule Inhale 1 capsule into the lungs daily Yes Paulina Carvalho MD   fluticasone (FLONASE) 50 MCG/ACT nasal spray 2 sprays by Nasal route daily Yes Paulina Carvalho MD   budesonide-formoterol (SYMBICORT) 160-4.5 MCG/ACT AERO INHALE 2 PUFFS INTO THE LUNGS TWICE DAILY Yes Simone Jean MD   PROAIR  (68 Base) MCG/ACT inhaler Inhale 2 puffs into the lungs every 6 hours as needed for Wheezing Patient requests \"Proair\" brand name only Yes Simone Jean MD   levalbuterol (XOPENEX) 0.63 MG/3ML nebulization Take 3 mLs by nebulization every 4 hours as needed for Wheezing or Shortness of Breath Yes Paulina Carvalho MD   Spacer/Aero-Holding Chambers LILIANE 1 Device by Does not apply route daily Yes Emili Foster MD   guaiFENesin (MUCINEX) 600 MG extended release tablet Take 600 mg by mouth daily Yes Historical Provider, MD   NIFEdipine (ADALAT CC) 90 MG extended release tablet TAKE 1 TABLET BY MOUTH DAILY Yes Simone Jean MD   Respiratory Therapy Supplies (NEBULIZER) LILIANE 1 applicator by Does not apply route 4 times daily as needed (SOB/wheezing) Yes Lay Dozier, DO   Multiple Vitamins-Minerals (THERAPEUTIC MULTIVITAMIN-MINERALS) tablet Take 1 tablet by mouth daily Yes Historical Provider, MD   atorvastatin (LIPITOR) 40 MG tablet Take 1 tablet by mouth nightly  Patient not taking: Reported on 2020  Pk Dowling MD         Past Medical History:   Diagnosis Date    Anemia     Asthma     Chronic kidney disease     COPD (chronic obstructive pulmonary disease) ([address]     severe    Essential hypertension     Hyperlipidemia        Past Surgical History:   Procedure Laterality Date    CARDIAC CATHETERIZATION  2013    Dr. Jyoti Mclaughlin      Right inguinal hernia repair    UPPER GASTROINTESTINAL ENDOSCOPY  2017    Dr. Cindi Santizo, hiatal hernia, mid gastric stricture. Family History   Problem Relation Age of Onset    Other Mother         back pain, cva    Other Father          young-heart attack    Other Brother         Healthy     Other Sister     Other Sister         Healthy        CareTeam (Including outside providers/suppliers regularly involved in providing care):   Patient Care Team:  John Oneal MD as PCP - General (Internal Medicine)  John Oneal MD as PCP - REHABILITATION [facility] Empaneled Provider  Yomaira Multani MD as Consulting Physician (Pulmonology)    Wt Readings from Last 3 Encounters:   20 141 lb (64 kg)   02/10/20 140 lb (63.5 kg)   20 137 lb (62.1 kg)     Patient-Reported Vitals 2020   Patient-Reported Weight 145 lbs   Patient-Reported Height 67\"      There is no height or weight on file to calculate BMI. Based upon direct observation of the patient, evaluation of cognition reveals recent and remote memory intact      Patient's complete Health Risk Assessment and screening values have been reviewed and are found in Flowsheets. The following problems were reviewed today and where indicated follow up appointments were made and/or referrals ordered.     Positive Risk Factor Screenings with Interventions:       General Health and ACP:  General  In general, how would you say your health is?: Good  In the past 7 days, have you experienced any of the following? New or Increased Pain, New or Increased Fatigue, Loneliness, Social Isolation, Stress or Anger?: None of These  Do you get the social and emotional support that you need?: Yes  Do you have a Living Will?: (!) No  Advance Directives     Power of  Living Will ACP-Advance Directive ACP-Power of     Not on File Not on File Filed [address] [facility] Risk Interventions:  · Will send Living Will.     Health Habits/Nutrition:  Health Habits/Nutrition  Do you exercise for at least 20 minutes 2-3 times per week?: Yes  Have you lost any weight without trying in the past 3 months?: No  Do you eat fewer than 2 meals per day?: No  Have you seen a dentist within the past year?: (!) No     Health Habits/Nutrition Interventions:  · edentulous    Hearing/Vision:  No exam data present  Hearing/Vision  Do you or your family notice any trouble with your hearing?: No  Do you have difficulty driving, watching TV, or doing any of your daily activities because of your eyesight?: No  Have you had an eye exam within the past year?: (!) No  Hearing/Vision Interventions:  · Will call for eye exam    Personalized Preventive Plan   Current Health Maintenance Status  Immunization History   Administered Date(s) Administered    Influenza Vaccine, unspecified formulation 09/23/2013, 11/11/2015, 11/11/2016    Influenza, High Dose (Fluzone 65 yrs and older) 11/11/2015, 11/11/2016, 10/30/2017, 11/26/2018    Influenza, Quadv, adjuvanted, 65 yrs +, IM, PF (Fluad) 11/10/2020    Influenza, Triv, inactivated, subunit, adjuvanted, IM (Fluad 65 yrs and older) 12/19/2019    Pneumococcal Conjugate 13-valent ([id number]) 11/11/2016    Pneumococcal Conjugate Vaccine 09/23/2013    Pneumococcal Polysaccharide ([id number]) 03/20/2015    Tdap (Boostrix, Adacel) 03/20/2015        Health Maintenance   Topic Date Due    AAA screen  1950    Shingles Vaccine (1 of 2) 08/25/1999   Gracie Rousseau Annual Wellness Visit (AWV)  05/29/2019    Colon Cancer Screen FIT/FOBT  03/19/2020    Lipid screen  12/19/2020    Potassium monitoring  02/11/2021    Creatinine monitoring  02/11/2021    DTaP/Tdap/Td vaccine (2 - Td) 03/20/2025    Flu vaccine  Completed    Pneumococcal 65+ years Vaccine  Completed    Hepatitis C screen  Completed    Hepatitis A vaccine  Aged Out    Hepatitis B vaccine  Aged Out    Hib vaccine  Aged Out    Meningococcal (ACWY) vaccine  Aged Out     Recommendations for [employer] Due: see orders and patient instructions/AVS.  . Recommended screening schedule for the next 5-10 years is provided to the patient in written form: see Patient Instructions/AVS.    Les Severe was seen today for medicare awv. Diagnoses and all orders for this visit:    Routine check-up    Hyperlipidemia, unspecified hyperlipidemia type  -     Lipid Panel; Future  -     Comprehensive Metabolic Panel, Fasting; Future    Tremulousness  -     TSH without Reflex; Future    Uncomplicated asthma, unspecified asthma severity, unspecified whether persistent    Screening for AAA (abdominal aortic aneurysm)  -     US SCREENING FOR AAA; Future    Screening for colon cancer  -     POCT Fecal Immunochemical Test (FIT); Future    Essential hypertension, benign            Glory Aguilar is a 69 y.o. male being evaluated by a Virtual Visit (video and audio) encounter to address concerns as mentioned above. A caregiver was present when appropriate. Due to this being a TeleHealth encounter (During PDIRQ-11 public health emergency), evaluation of the following organ systems was limited: Vitals/Constitutional/EENT/Resp/CV/GI//MS/Neuro/Skin/Heme-Lymph-Imm.   Pursuant to the emergency declaration under the [facility], 1135 waiver authority and the [employer] Act, this Virtual Visit was conducted with patient's (and/or legal guardian's) consent, to reduce the patient's risk of exposure to COVID-19 and provide necessary medical care. The patient (and/or legal guardian) has also been advised to contact this office for worsening conditions or problems, and seek emergency medical treatment and/or call 911 if deemed necessary. Patient identification was verified at the start of the visit: {YES/    Services were provided through a video synchronous discussion virtually to substitute for in-person clinic visit. Patient and provider were located at their individual homes. --Aravind Waddell MD on 2020 at 12:34 PM    An electronic signature was used to authenticate this note. 2020    TELEHEALTH EVALUATION -- Audio/Visual (During RPLZE-63 public health emergency)    HPI:    Ben Kapoor (:  1950) has requested an audio/video evaluation for the following concern(s):    Routine visit. He is now on home oxygen for his severe COPD. He states he feels okay at rest but when he gets up to move around he becomes very dyspneic. The inhalers he takes help him but he does get tremulous with albuterol and even with Xopenex. He has no new complaints. He stopped taking his statin for unknown reasons. He denies chest pain. He denies major changes in his weight. Review of Systems    Prior to Visit Medications    Medication Sig Taking?  Authorizing Provider   tiotropium (SPIRIVA HANDIHALER) 18 MCG inhalation capsule Inhale 1 capsule into the lungs daily Yes Jean Murillo MD   fluticasone (FLONASE) 50 MCG/ACT nasal spray 2 sprays by Nasal route daily Yes Jean Murillo MD   budesonide-formoterol (SYMBICORT) 160-4.5 MCG/ACT AERO INHALE 2 PUFFS INTO THE LUNGS TWICE DAILY Yes Kenny Patrick MD   PROAIR  (57 Base) MCG/ACT inhaler Inhale 2 puffs into the lungs every 6 hours as needed for Wheezing Patient requests \"Proair\" brand name only Yes Kenny Patrick MD   levalbuterol (XOPENEX) 0.63 MG/3ML nebulization Take 3 mLs by nebulization every 4 hours as needed for Wheezing or Shortness of Breath Yes Glenda Partida MD   Spacer/Aero-Holding Cleven Furrow LILIANE 1 Device by Does not apply route daily Yes Abby Tovar MD   guaiFENesin (MUCINEX) 600 MG extended release tablet Take 600 mg by mouth daily Yes Historical Provider, MD   NIFEdipine (ADALAT CC) 90 MG extended release tablet TAKE 1 TABLET BY MOUTH DAILY Yes Corby Kay MD   Respiratory Therapy Supplies (NEBULIZER) LILIANE 1 applicator by Does not apply route 4 times daily as needed (SOB/wheezing) Yes Will High, DO   Multiple Vitamins-Minerals (THERAPEUTIC MULTIVITAMIN-MINERALS) tablet Take 1 tablet by mouth daily Yes Historical Provider, MD   atorvastatin (LIPITOR) 40 MG tablet Take 1 tablet by mouth nightly  Patient not taking: Reported on 2020  Abby Tovar MD       Social History     Tobacco Use    Smoking status: Former Smoker     Packs/day: 0.50     Years: 45.00     Pack years: 22.50     Last attempt to quit: 10/21/2015     Years since quittin.1    Smokeless tobacco: Never Used   Substance Use Topics    Alcohol use: No     Alcohol/week: 0.0 standard drinks    Drug use: No        Past Medical History:   Diagnosis Date    Anemia     Asthma     Chronic kidney disease     COPD (chronic obstructive pulmonary disease) (HCC)     severe    Essential hypertension     Hyperlipidemia        PHYSICAL EXAMINATION:    Constitutional: [x] Appears well-developed and well-nourished [x] No apparent distress  Not wearing oxygen by nasal cannula      Mental status  [x] Alert and awake  [x] Oriented to person/place/time [x]Able to follow commands      Eyes:  EOM    [x]  Normal   Sclera  [x]  Normal     HENT:   [x] Normocephalic, atraumatic.     Mouth/Throat: Mucous membranes are moist.     External Ears [x] Normal      Neck: [x] No visualized mass     Pulmonary/Chest: [x] Respiratory effort normal.  [x] No visualized signs of difficulty breathing or respiratory distress Musculoskeletal:   [x] Normal gait with no signs of ataxia         [x] Normal range of motion of neck          Neurological:        [x] No Facial Asymmetry (Cranial nerve 7 motor function) (limited exam to video visit)      Skin:        [x] No significant exanthematous lesions or discoloration noted on facial skin                  Psychiatric:       [x] Normal Affect         Other pertinent observable physical exam findings-   Patient-Reported Vitals 12/7/2020   Patient-Reported Weight 145 lbs   Patient-Reported Height 67\"      ASSESSMENT/PLAN:  1. Routine check-up      2. Hyperlipidemia, unspecified hyperlipidemia type  Not taking the statin. Advised him to recheck values. - Lipid Panel; Future  - Comprehensive Metabolic Panel, Fasting; Future    3. Tremulousness  He attributes to his inhalers but on chart review his thyroid has never been checked. - TSH without Reflex; Future    4. Uncomplicated asthma, unspecified asthma severity, unspecified whether persistent  This is a stable problem under the care of his pulmonary physician. He remains on Spiriva, Symbicort, and Xopenex. 5. Screening for AAA (abdominal aortic aneurysm)    - US SCREENING FOR AAA; Future    6. Screening for colon cancer    - POCT Fecal Immunochemical Test (FIT); Future    7. Essential hypertension, benign  He remains on nifedipine and he plans to purchase a blood pressure cuff so he can check home blood pressures. Return in about 3 months (around 3/7/2021). Damaris Kehr is a 69 y.o. male being evaluated by a Virtual Visit (video visit) encounter to address concerns as mentioned above. A caregiver was present when appropriate. Due to this being a TeleHealth encounter (During PTBYS-83 public health emergency), evaluation of the following organ systems was limited: Vitals/Constitutional/EENT/Resp/CV/GI//MS/Neuro/Skin/Heme-Lymph-Imm.   Pursuant to the emergency declaration under the [facility] Act, 1135 waiver authority and the Coronavirus Preparedness and Response Supplemental Appropriations Act, this Virtual Visit was conducted with patient's (and/or legal guardian's) consent, to reduce the patient's risk of exposure to COVID-19 and provide necessary medical care. The patient (and/or legal guardian) has also been advised to contact this office for worsening conditions or problems, and seek emergency medical treatment and/or call 911 if deemed necessary. Patient identification was verified at the start of the visit: {YES/    Total time spent on this encounter: {Time Spent:30 minutes    Services were provided through a video synchronous discussion virtually to substitute for in-person clinic visit. Patient and provider were located at their individual homes. --Brenda Gore MD on 12/7/2020 at 12:34 PM    An electronic signature was used to authenticate this note.

## 2021-01-08 ENCOUNTER — TELEPHONE (OUTPATIENT)
Dept: INTERNAL MEDICINE CLINIC | Age: 71
End: 2021-01-08

## 2021-01-08 NOTE — TELEPHONE ENCOUNTER
Spoke with patient will bring the stool specimens in at his next office visit. He has new insurance and wants to make sure they will pay for the testing.

## 2021-01-08 NOTE — TELEPHONE ENCOUNTER
----- Message from [facility] sent at 1/8/2021 12:35 PM EST -----  Subject: Message to Provider    QUESTIONS  Information for Provider? pt would like to know if he should send in Colon   Kit and if he could bring in to the office or by mail   he is confused on where to send. ---------------------------------------------------------------------------  --------------  Chaim BURCH  What is the best way for the office to contact you? OK to leave message on   voicemail  Preferred Call Back Phone Number? [phone]  ---------------------------------------------------------------------------  --------------  SCRIPT ANSWERS  Relationship to Patient?  Self

## 2021-01-22 ENCOUNTER — TELEPHONE (OUTPATIENT)
Dept: INTERNAL MEDICINE CLINIC | Age: 71
End: 2021-01-22

## 2021-03-02 ENCOUNTER — VIRTUAL VISIT (OUTPATIENT)
Dept: PULMONOLOGY | Age: 71
End: 2021-03-02
Payer: MEDICARE

## 2021-03-02 DIAGNOSIS — R06.09 DOE (DYSPNEA ON EXERTION): ICD-10-CM

## 2021-03-02 DIAGNOSIS — J44.9 COPD, VERY SEVERE (HCC): ICD-10-CM

## 2021-03-02 DIAGNOSIS — Z87.891 HISTORY OF TOBACCO USE: Primary | ICD-10-CM

## 2021-03-02 PROCEDURE — 1123F ACP DISCUSS/DSCN MKR DOCD: CPT | Performed by: INTERNAL MEDICINE

## 2021-03-02 PROCEDURE — 1036F TOBACCO NON-USER: CPT | Performed by: INTERNAL MEDICINE

## 2021-03-02 PROCEDURE — 3023F SPIROM DOC REV: CPT | Performed by: INTERNAL MEDICINE

## 2021-03-02 PROCEDURE — 99214 OFFICE O/P EST MOD 30 MIN: CPT | Performed by: INTERNAL MEDICINE

## 2021-03-02 PROCEDURE — 3017F COLORECTAL CA SCREEN DOC REV: CPT | Performed by: INTERNAL MEDICINE

## 2021-03-02 PROCEDURE — G8421 BMI NOT CALCULATED: HCPCS | Performed by: INTERNAL MEDICINE

## 2021-03-02 PROCEDURE — G8484 FLU IMMUNIZE NO ADMIN: HCPCS | Performed by: INTERNAL MEDICINE

## 2021-03-02 PROCEDURE — G8926 SPIRO NO PERF OR DOC: HCPCS | Performed by: INTERNAL MEDICINE

## 2021-03-02 PROCEDURE — 4040F PNEUMOC VAC/ADMIN/RCVD: CPT | Performed by: INTERNAL MEDICINE

## 2021-03-02 PROCEDURE — G8427 DOCREV CUR MEDS BY ELIG CLIN: HCPCS | Performed by: INTERNAL MEDICINE

## 2021-03-02 NOTE — PROGRESS NOTES
Pulmonology Video Visit    Pursuant to the emergency declaration under the [address] 1135 waiver authority and the Coronavirus Preparedness and [facility] Appropriations Act this Video Visit was insisted, with patient's consent, to reduce the patient's risk of exposure to COVID-19 and provide continuity of care for an established patient. The patient was at home, while the provider was at the clinic. Services were provided through a synchronous discussion through a Video Visit to substitute for in-person clinic visit, and coded as such. [facility]    Outpatient Follow Up Note    Subjective:   CHIEF COMPLAINT / HPI:     The patient is 69 y.o. male who asked for a video visit  for follow-up of very severe COPD complicated with chronic hypoxemic respiratory failure. He complains of dyspnea on exertion with his routine activities of daily living. This however is not worse when compared to his chronic baseline but still is quite troublesome. Albuterol does help but wears off in several hours. He is taking Symbicort, Spiriva, and does wear oxygen when outside the house but not always when he is ambulating in his house due to the weight of his portable oxygen concentrator. He has no associated fevers, chills, cough, chest pain, peripheral edema, or wheezing    11/10/2020  Ernie Martinez is here for follow-up of very severe COPD complicated with chronic hypoxemic respiratory failure. Since last visit in February he has done reasonably well given his degree of COPD. He continues on Symbicort and Spiriva but is asking for Spiriva HandiHaler instead of Respimat as he states it is easier to use. He was having tachycardia and tremors with albuterol nebulizer so that was changed to Xopenex but did not help with those symptoms and thus rarely uses it.   His chronic dyspnea on exertion seems to be at baseline and he denies any significant cough, wheezing, chest tightness. He does mention that he has clear nasal rhinorrhea that is a nuisance    2/17/2020  Cesar Iyer is here for follow-up of very severe COPD. After last visit I ordered PFTs, ABG, CT chest, and echo. During the test he was noted to be short of breath and was sent to the ER. He was admitted for 23-hour observation for treatment of a COPD exacerbation. Today he states that he is doing quite a bit better. He was discharged on home oxygen and was given Xopenex to use in place of albuterol due to tachycardia and tremors. He is using oxygen in his house and states that helps quite a bit. He has not been using oxygen when he leaves the house and states that he does not think he really needs it. He is compliant with his MDIs. He has not started pulmonary rehab yet but states that he will    Initial visit 1/20/2020  Cesar Iyer presents today for a new patient visit for evaluation of dyspnea on exertion that has progressively worsened over the last 7 months. Franc Gaspar has a history of COPD dating back to 2013. He had PFTs at that time that showed a very severe obstructive defect. He is only been to the emergency once for his COPD and that was in 2017. He did not need to be admitted. He is not on home oxygen and has not participated in pulmonary rehab. His regimen includes Symbicort, Spiriva, and albuterol, which she does use daily. He had a CT chest in March 2018 that showed severe apical predominant emphysema. Alpha-1 antitrypsin done in December was 164. He had a stress echo back in 2013 that showed a reversible ischemic defect with elevated pulmonary pressures. He had a left heart catheterization after that that showed no CAD. Patient likes to ride a stationary bicycle but unfortunately can only do that for about 2 minutes at present due to dyspnea. He also gets short of breath and a hot shower and with steps.   He will get short of breath if he walks too much around his house room to room. He has a mild cough with occasional clear phlegm but no hemoptysis. Occasionally has some chest tightness and wheezing. No fevers chills anorexia or weight loss. No peripheral edema or orthopnea. Past Medical History:    Past Medical History:   Diagnosis Date    Anemia     Asthma     Chronic kidney disease     COPD (chronic obstructive pulmonary disease) (HCC)     severe, pft 2/20    Essential hypertension     Hyperlipidemia        Social History:    Patient is a retired . He smoked half a pack of cigarettes a day for 45 years and quit in 2015.     Family History:  Asthma  TB    Current Medications:  Current Outpatient Medications on File Prior to Visit   Medication Sig Dispense Refill    VENTOLIN  (90 Base) MCG/ACT inhaler Inhale 2 puffs into the lungs every 6 hours as needed for Wheezing 1 Inhaler 3    tiotropium (SPIRIVA HANDIHALER) 18 MCG inhalation capsule Inhale 1 capsule into the lungs daily 30 capsule 11    fluticasone (FLONASE) 50 MCG/ACT nasal spray 2 sprays by Nasal route daily 1 Bottle 11    budesonide-formoterol (SYMBICORT) 160-4.5 MCG/ACT AERO INHALE 2 PUFFS INTO THE LUNGS TWICE DAILY 10.2 g 11    levalbuterol (XOPENEX) 0.63 MG/3ML nebulization Take 3 mLs by nebulization every 4 hours as needed for Wheezing or Shortness of Breath 3 vial 3    atorvastatin (LIPITOR) 40 MG tablet Take 1 tablet by mouth nightly (Patient not taking: Reported on 2/17/2020) 30 tablet 3    Spacer/Aero-Holding Chambers LILIANE 1 Device by Does not apply route daily 1 Device 0    guaiFENesin (MUCINEX) 600 MG extended release tablet Take 600 mg by mouth daily      NIFEdipine (ADALAT CC) 90 MG extended release tablet TAKE 1 TABLET BY MOUTH DAILY 90 tablet 1    Respiratory Therapy Supplies (NEBULIZER) LILIANE 1 applicator by Does not apply route 4 times daily as needed (SOB/wheezing) 1 Device 0    Multiple Vitamins-Minerals (THERAPEUTIC MULTIVITAMIN-MINERALS) tablet Take 1 tablet by mouth daily       No current facility-administered medications on file prior to visit. REVIEW OF SYSTEMS:    CONSTITUTIONAL: Negative for fevers and chills  HEENT: Negative for oropharyngeal exudate, post nasal drip, sinus pain / pressure, nasal congestion, ear pain  RESPIRATORY:  See HPI  CARDIOVASCULAR: Negative for chest pain, palpitations, edema  GASTROINTESTINAL: Negative for nausea, vomiting, diarrhea, constipation and abdominal pain  GENITOURINARY: Negative for dysuria, urinary frequency, urinary hesitancy  HEMATOLOGICAL: Negative for adenopathy  SKIN: Negative for clubbing, cyanosis, skin lesions  ENDOCRINE: Negative for polyuria, polydipsia, heat intolerance, cold intolerance   EXTREMITIES: Negative for weakness or decreased ROM in all extremities  NEUROLOGICAL: Negative for unilateral weakness, speech or gait abnormalities    Objective:   PHYSICAL EXAM:        VITALS:  There were no vitals taken for this visit. Virtual visit -no exam possible    DATA:      A1AT 164    Radiology Review:  Pertinent images / reports were reviewed as a part of this visit. CT Chest 2/10/2020  FINDINGS:       Lungs and Pleura: Severe emphysema with panlobular changes in the upper lobes. Centrilobular changes in the lower lobes. No pleural effusion. No significant consolidation. Calcified granuloma in the left lower lung.       Mediastinum: No paratracheal lymphadenopathy. Large calcified subcarinal lymph node compatible with incidental calcified granuloma. Main pulmonary artery is upper limits of normal measuring 3 cm. No hilar lymphadenopathy identified. No pericardial    effusion. Normal heart size.       Lower neck and chest wall: No axillary lymphadenopathy. No supraclavicular lymphadenopathy.       Upper Abdomen: No discrete liver abnormality. Spleen is unremarkable. No hydronephrosis visualized.       Bones: Mild degenerative disease. No acute osseous abnormality.           Impression       1.  Severe emphysema SPIROMETRY                  Units    Pred  PreDrug  %Pred  PostDrug  %Pred  %Chg  FVC         L,btps       3.36     1.96     16      0.07     72    73  FEV1        L,btps       2.57     0.66     95      7.00     64    20  FEV1/FVC    %           78.       05.       43     35.       45     5  FEV3        L,btps       3.26     1. 14     94      7.90     67    17  XGS08-17%   L/s          2.37     0.20      8      0.31     35    12  FEFmax      L/s          7.29     1.52     82      6.56     21    -1  FIFmax      L/s                   2.49             7.72          -38  TET         S                    26.78            11.03           -9    PLETHYSMOGRAPHY                     Units    Pred  PreDrug  %Pred   TLC            L,btps       5.52    12.01    217   FRC            L,btps       3.42    10.58    309   RV             L,btps       2.17     4.33    402   ERV            L,btps       1.25     0.84     67   RV/TLC         %           39.      81.      207   VTG            L,btps               10.57          VC             L,btps       3.36     2.27     68   RAW            cmH2O/L/s    0.20    11.41            ECHO 2/10/2020   Summary   Left ventricular cavity size is normal with mild concentric left ventricular   hypertrophy. Left ventricular function is normal with ejection fraction estimated at   50-55%. Indeterminate diastolic function. Slight thickening of leaflets of mitral valve. Mild mitral regurgitation   Aortic valve appears thickened/calcified but opens adequately. Mild tricuspid regurgitation. Estimated pulmonary artery systolic pressure is at 35 mmHg assuming a right   atrial pressure of 3 mmHg. Stress echo October 25, 2013 [facility]  FINAL INTERPRETATION  Abnormal study consistent with a small area of ischemia. Overall study quality is excellent. Scan significance indicates low to moderate cardiac risk. There is right ventricular dilation.   There is septal flattening at stress consistent

## 2021-03-26 ENCOUNTER — TELEPHONE (OUTPATIENT)
Dept: INTERNAL MEDICINE CLINIC | Age: 71
End: 2021-03-26

## 2021-03-26 NOTE — TELEPHONE ENCOUNTER
Patient wants to inform the DRRegino  That he has been having breathing issues and he has some issues with his inhalers feels like its causing some strange side effects that are getting worst. Please call the patient at   [phone]

## 2021-03-26 NOTE — TELEPHONE ENCOUNTER
Cancelled appt for today due to breathing issues. Used his inhaler & put on his oxygen. Feels great right now. States when he gets short of breath to where he has to use inhaler & oxygen , he has to use the bathroom (urination & bowel movement). Doesn't happen all the time but wondering if that is a side affect from using inhaler & oxygen    Advised patient if he gets short of breath from moving around, he should use his oxygen to prevent that so he does not continue to cancel his appts. Patient rescheduled his appt for April 7th to be a virtual. Should that appt be in person or is virtual ok?

## 2021-03-29 NOTE — TELEPHONE ENCOUNTER
Olafke to daughter Sherry German and we changed to an in-person visit. She is going to check with patient-usually does better after 12:00. If a problem she will let us know.

## 2021-04-26 ENCOUNTER — VIRTUAL VISIT (OUTPATIENT)
Dept: INTERNAL MEDICINE CLINIC | Age: 71
End: 2021-04-26
Payer: MEDICARE

## 2021-04-26 DIAGNOSIS — R06.00 DYSPNEA, UNSPECIFIED TYPE: Primary | ICD-10-CM

## 2021-04-26 DIAGNOSIS — I10 ESSENTIAL HYPERTENSION, BENIGN: ICD-10-CM

## 2021-04-26 DIAGNOSIS — D64.9 ANEMIA, UNSPECIFIED TYPE: ICD-10-CM

## 2021-04-26 DIAGNOSIS — E78.5 HYPERLIPIDEMIA, UNSPECIFIED HYPERLIPIDEMIA TYPE: ICD-10-CM

## 2021-04-26 PROCEDURE — 3017F COLORECTAL CA SCREEN DOC REV: CPT | Performed by: INTERNAL MEDICINE

## 2021-04-26 PROCEDURE — 99214 OFFICE O/P EST MOD 30 MIN: CPT | Performed by: INTERNAL MEDICINE

## 2021-04-26 PROCEDURE — 4040F PNEUMOC VAC/ADMIN/RCVD: CPT | Performed by: INTERNAL MEDICINE

## 2021-04-26 PROCEDURE — 1036F TOBACCO NON-USER: CPT | Performed by: INTERNAL MEDICINE

## 2021-04-26 PROCEDURE — G8421 BMI NOT CALCULATED: HCPCS | Performed by: INTERNAL MEDICINE

## 2021-04-26 PROCEDURE — 1123F ACP DISCUSS/DSCN MKR DOCD: CPT | Performed by: INTERNAL MEDICINE

## 2021-04-26 PROCEDURE — G8427 DOCREV CUR MEDS BY ELIG CLIN: HCPCS | Performed by: INTERNAL MEDICINE

## 2021-04-26 RX ORDER — FUROSEMIDE 20 MG/1
20 TABLET ORAL DAILY
Qty: 7 TABLET | Refills: 0 | Status: SHIPPED | OUTPATIENT
Start: 2021-04-26 | End: 2021-08-26 | Stop reason: SDUPTHER

## 2021-04-26 ASSESSMENT — PATIENT HEALTH QUESTIONNAIRE - PHQ9
SUM OF ALL RESPONSES TO PHQ QUESTIONS 1-9: 0
1. LITTLE INTEREST OR PLEASURE IN DOING THINGS: 0
2. FEELING DOWN, DEPRESSED OR HOPELESS: 0
SUM OF ALL RESPONSES TO PHQ QUESTIONS 1-9: 0

## 2021-04-26 NOTE — PROGRESS NOTES
4/26/2021   Patient-Reported Weight 145 lbs   Patient-Reported Height 67\"   Patient-Reported Temperature 93   Patient-Reported SpO2 90         Past Medical History:   Diagnosis Date    Anemia     Asthma     Chronic kidney disease     COPD (chronic obstructive pulmonary disease) ([facility])     severe, pft 2/20    Essential hypertension     Hyperlipidemia        Current Outpatient Medications   Medication Sig Dispense Refill    furosemide (LASIX) 20 MG tablet Take 1 tablet by mouth daily for 7 days 7 tablet 0    VENTOLIN  (90 Base) MCG/ACT inhaler Inhale 2 puffs into the lungs every 6 hours as needed for Wheezing 1 Inhaler 3    tiotropium (SPIRIVA HANDIHALER) 18 MCG inhalation capsule Inhale 1 capsule into the lungs daily 30 capsule 11    fluticasone (FLONASE) 50 MCG/ACT nasal spray 2 sprays by Nasal route daily 1 Bottle 11    budesonide-formoterol (SYMBICORT) 160-4.5 MCG/ACT AERO INHALE 2 PUFFS INTO THE LUNGS TWICE DAILY 10.2 g 11    atorvastatin (LIPITOR) 40 MG tablet Take 1 tablet by mouth nightly 30 tablet 3    Spacer/Aero-Holding Chambers LILAINE 1 Device by Does not apply route daily 1 Device 0    guaiFENesin (MUCINEX) 600 MG extended release tablet Take 600 mg by mouth daily      NIFEdipine (ADALAT CC) 90 MG extended release tablet TAKE 1 TABLET BY MOUTH DAILY 90 tablet 1    Respiratory Therapy Supplies (NEBULIZER) LILIANE 1 applicator by Does not apply route 4 times daily as needed (SOB/wheezing) 1 Device 0    Multiple Vitamins-Minerals (THERAPEUTIC MULTIVITAMIN-MINERALS) tablet Take 1 tablet by mouth daily      levalbuterol (XOPENEX) 0.63 MG/3ML nebulization Take 3 mLs by nebulization every 4 hours as needed for Wheezing or Shortness of Breath (Patient not taking: Reported on 4/26/2021) 3 vial 3     No current facility-administered medications for this visit.         Constitutional: [x] Appears well-developed and well-nourished [x] No apparent distress        Mental status  [x] Alert and awake problems, and seek emergency medical treatment and/or call 911 if deemed necessary. Patient identification was verified at the start of the visit: {YES    Services were provided through a video synchronous discussion virtually to substitute for in-person clinic visit. Patient was located at home and provider was located in office or at home. This note was generated completely or in part utilizing Dragon dictation speech recognition software. Occasionally, words are mistranscribed and despite editing, the text may contain inaccuracies due to incorrect word recognition. If further clarification is needed please contact the office at [phone]          An electronic signature was used to authenticate this note.     --Ana Lilia Danielle MD

## 2021-04-26 NOTE — PATIENT INSTRUCTIONS
Labs here     CT Chest [phone] (Th)    Get home blood pressure cuff  Furosemide for pulmonary hypertension may help shortness of breath    OTC -Citrucel (fiber) and /or probiotics (Florastor or Align OTC) for firm up stools    If Chest CT doesn't show reasons for increased shortness of breath then will get stress test.

## 2021-04-27 ASSESSMENT — ENCOUNTER SYMPTOMS
BLOOD IN STOOL: 0
ABDOMINAL PAIN: 0

## 2021-04-29 ENCOUNTER — HOSPITAL ENCOUNTER (OUTPATIENT)
Dept: CT IMAGING | Age: 71
Discharge: HOME | End: 2021-04-29
Payer: MEDICARE

## 2021-04-29 ENCOUNTER — TELEPHONE (OUTPATIENT)
Dept: INTERNAL MEDICINE CLINIC | Age: 71
End: 2021-04-29

## 2021-04-29 DIAGNOSIS — R06.00 DYSPNEA, UNSPECIFIED TYPE: Primary | ICD-10-CM

## 2021-04-29 DIAGNOSIS — Z87.891 HISTORY OF TOBACCO USE: ICD-10-CM

## 2021-04-29 PROCEDURE — 71271 CT THORAX LUNG CANCER SCR C-: CPT

## 2021-04-29 NOTE — TELEPHONE ENCOUNTER
Juliette Grant the patients daughter called wanted to inform the Doctor that He has completed  the CT scan. And would like to have you call patient on the number provided stating that Dr. Bessy Loco wanted to speak with the patient after this CT was completed.

## 2021-04-30 ENCOUNTER — TELEPHONE (OUTPATIENT)
Dept: INTERNAL MEDICINE CLINIC | Age: 71
End: 2021-04-30

## 2021-04-30 NOTE — TELEPHONE ENCOUNTER
----- Message from Luba Leonardo sent at 4/29/2021  4:58 PM EDT -----  Subject: Refill Request    QUESTIONS  Name of Medication? Other - Spacer for inhaler   Patient-reported dosage and instructions? use it when taking inhaler   How many days do you have left? 0  Preferred Pharmacy? [id number]  Pharmacy phone number (if available)? [phone]  ---------------------------------------------------------------------------  --------------  Wilian BURCH  What is the best way for the office to contact you? OK to leave message on   voicemail  Preferred Call Back Phone Number?  [phone]

## 2021-05-05 ENCOUNTER — TELEPHONE (OUTPATIENT)
Dept: CT IMAGING | Age: 71
End: 2021-05-05

## 2021-05-10 ENCOUNTER — HOSPITAL ENCOUNTER (OUTPATIENT)
Dept: NON INVASIVE DIAGNOSTICS | Age: 71
Discharge: HOME | End: 2021-05-10
Payer: MEDICARE

## 2021-05-10 DIAGNOSIS — R06.00 DYSPNEA, UNSPECIFIED TYPE: ICD-10-CM

## 2021-05-10 LAB
LV EF: 64 %
LVEF MODALITY: NORMAL

## 2021-05-10 PROCEDURE — 2580000003 HC RX 258: Performed by: INTERNAL MEDICINE

## 2021-05-10 PROCEDURE — 3430000000 HC RX DIAGNOSTIC RADIOPHARMACEUTICAL: Performed by: INTERNAL MEDICINE

## 2021-05-10 PROCEDURE — 6360000002 HC RX W HCPCS: Performed by: INTERNAL MEDICINE

## 2021-05-10 PROCEDURE — A9502 TC99M TETROFOSMIN: HCPCS | Performed by: INTERNAL MEDICINE

## 2021-05-10 PROCEDURE — 78452 HT MUSCLE IMAGE SPECT MULT: CPT

## 2021-05-10 PROCEDURE — 93017 CV STRESS TEST TRACING ONLY: CPT

## 2021-05-10 RX ORDER — SODIUM CHLORIDE 0.9 % (FLUSH) 0.9 %
10 SYRINGE (ML) INJECTION PRN
Status: DISCONTINUED | OUTPATIENT
Start: 2021-05-10 | End: 2021-05-11 | Stop reason: HOSPADM

## 2021-05-10 RX ADMIN — Medication 10 ML: at 11:45

## 2021-05-10 RX ADMIN — Medication 10 ML: at 10:32

## 2021-05-10 RX ADMIN — TETROFOSMIN 9.5 MILLICURIE: 1.38 INJECTION, POWDER, LYOPHILIZED, FOR SOLUTION INTRAVENOUS at 10:32

## 2021-05-10 RX ADMIN — TETROFOSMIN 31.7 MILLICURIE: 1.38 INJECTION, POWDER, LYOPHILIZED, FOR SOLUTION INTRAVENOUS at 11:45

## 2021-05-10 RX ADMIN — REGADENOSON 0.4 MG: 0.08 INJECTION, SOLUTION INTRAVENOUS at 11:45

## 2021-05-11 ENCOUNTER — TELEPHONE (OUTPATIENT)
Dept: INTERNAL MEDICINE CLINIC | Age: 71
End: 2021-05-11

## 2021-05-11 NOTE — TELEPHONE ENCOUNTER
Re dyspnea:  Spoke to patient about his stress test which was normal.  His daughter told me that he did improve while taking the Lasix. Advised him to get labs which include BNP. Told him that if it is elevated, we may continue daily Lasix. If not told him he will need to further address the shortness of breath with the pulmonary physician.

## 2021-06-09 ENCOUNTER — TELEPHONE (OUTPATIENT)
Dept: INTERNAL MEDICINE CLINIC | Age: 71
End: 2021-06-09

## 2021-06-09 DIAGNOSIS — R06.00 DYSPNEA, UNSPECIFIED TYPE: ICD-10-CM

## 2021-06-09 DIAGNOSIS — D64.9 ANEMIA, UNSPECIFIED TYPE: ICD-10-CM

## 2021-06-09 DIAGNOSIS — D64.9 ANEMIA, UNSPECIFIED TYPE: Primary | ICD-10-CM

## 2021-06-09 DIAGNOSIS — E78.5 HYPERLIPIDEMIA, UNSPECIFIED HYPERLIPIDEMIA TYPE: ICD-10-CM

## 2021-06-09 LAB
A/G RATIO: 1.7 (ref 1.1–2.2)
ALBUMIN SERPL-MCNC: 4.5 G/DL (ref 3.4–5)
ALP BLD-CCNC: 55 U/L (ref 40–129)
ALT SERPL-CCNC: 12 U/L (ref 10–40)
ANION GAP SERPL CALCULATED.3IONS-SCNC: 11 MMOL/L (ref 3–16)
AST SERPL-CCNC: 20 U/L (ref 15–37)
BILIRUB SERPL-MCNC: 0.3 MG/DL (ref 0–1)
BUN BLDV-MCNC: 18 MG/DL (ref 7–20)
CALCIUM SERPL-MCNC: 9.8 MG/DL (ref 8.3–10.6)
CHLORIDE BLD-SCNC: 101 MMOL/L (ref 99–110)
CHOLESTEROL, FASTING: 223 MG/DL (ref 0–199)
CO2: 31 MMOL/L (ref 21–32)
CREAT SERPL-MCNC: 1.4 MG/DL (ref 0.8–1.3)
GFR AFRICAN AMERICAN: >60
GFR NON-AFRICAN AMERICAN: 50
GLOBULIN: 2.6 G/DL
GLUCOSE FASTING: 98 MG/DL (ref 70–99)
HCT VFR BLD CALC: 35.1 % (ref 40.5–52.5)
HDLC SERPL-MCNC: 89 MG/DL (ref 40–60)
HEMOGLOBIN: 11.7 G/DL (ref 13.5–17.5)
LDL CHOLESTEROL CALCULATED: 125 MG/DL
MCH RBC QN AUTO: 28.8 PG (ref 26–34)
MCHC RBC AUTO-ENTMCNC: 33.3 G/DL (ref 31–36)
MCV RBC AUTO: 86.4 FL (ref 80–100)
PDW BLD-RTO: 15.3 % (ref 12.4–15.4)
PLATELET # BLD: 229 K/UL (ref 135–450)
PMV BLD AUTO: 7.4 FL (ref 5–10.5)
POTASSIUM SERPL-SCNC: 4.5 MMOL/L (ref 3.5–5.1)
PRO-BNP: 172 PG/ML (ref 0–124)
RBC # BLD: 4.06 M/UL (ref 4.2–5.9)
SODIUM BLD-SCNC: 143 MMOL/L (ref 136–145)
TOTAL PROTEIN: 7.1 G/DL (ref 6.4–8.2)
TRIGLYCERIDE, FASTING: 44 MG/DL (ref 0–150)
VLDLC SERPL CALC-MCNC: 9 MG/DL
WBC # BLD: 3.8 K/UL (ref 4–11)

## 2021-07-22 RX ORDER — ALBUTEROL SULFATE 90 UG/1
AEROSOL, METERED RESPIRATORY (INHALATION)
Qty: 54 G | Refills: 1 | Status: SHIPPED | OUTPATIENT
Start: 2021-07-22 | End: 2021-08-26 | Stop reason: SDUPTHER

## 2021-08-19 ENCOUNTER — OFFICE VISIT (OUTPATIENT)
Dept: PULMONOLOGY | Age: 71
End: 2021-08-19
Payer: MEDICARE

## 2021-08-19 VITALS
HEART RATE: 89 BPM | WEIGHT: 145 LBS | OXYGEN SATURATION: 97 % | TEMPERATURE: 96.7 F | HEIGHT: 67 IN | RESPIRATION RATE: 18 BRPM | BODY MASS INDEX: 22.76 KG/M2 | DIASTOLIC BLOOD PRESSURE: 100 MMHG | SYSTOLIC BLOOD PRESSURE: 132 MMHG

## 2021-08-19 DIAGNOSIS — R06.09 DOE (DYSPNEA ON EXERTION): ICD-10-CM

## 2021-08-19 DIAGNOSIS — Z87.891 HISTORY OF TOBACCO USE: ICD-10-CM

## 2021-08-19 DIAGNOSIS — J44.9 COPD, VERY SEVERE (HCC): Primary | ICD-10-CM

## 2021-08-19 DIAGNOSIS — J96.11 CHRONIC RESPIRATORY FAILURE WITH HYPOXIA (HCC): ICD-10-CM

## 2021-08-19 PROCEDURE — 1123F ACP DISCUSS/DSCN MKR DOCD: CPT | Performed by: INTERNAL MEDICINE

## 2021-08-19 PROCEDURE — G8420 CALC BMI NORM PARAMETERS: HCPCS | Performed by: INTERNAL MEDICINE

## 2021-08-19 PROCEDURE — 4040F PNEUMOC VAC/ADMIN/RCVD: CPT | Performed by: INTERNAL MEDICINE

## 2021-08-19 PROCEDURE — 1036F TOBACCO NON-USER: CPT | Performed by: INTERNAL MEDICINE

## 2021-08-19 PROCEDURE — 3017F COLORECTAL CA SCREEN DOC REV: CPT | Performed by: INTERNAL MEDICINE

## 2021-08-19 PROCEDURE — G8926 SPIRO NO PERF OR DOC: HCPCS | Performed by: INTERNAL MEDICINE

## 2021-08-19 PROCEDURE — 99214 OFFICE O/P EST MOD 30 MIN: CPT | Performed by: INTERNAL MEDICINE

## 2021-08-19 PROCEDURE — G8427 DOCREV CUR MEDS BY ELIG CLIN: HCPCS | Performed by: INTERNAL MEDICINE

## 2021-08-19 PROCEDURE — 3023F SPIROM DOC REV: CPT | Performed by: INTERNAL MEDICINE

## 2021-08-19 RX ORDER — AZITHROMYCIN 250 MG/1
TABLET, FILM COATED ORAL
Qty: 6 TABLET | Refills: 0 | Status: SHIPPED | OUTPATIENT
Start: 2021-08-19 | End: 2021-10-14

## 2021-08-19 RX ORDER — PREDNISONE 20 MG/1
40 TABLET ORAL DAILY
Qty: 14 TABLET | Refills: 0 | Status: SHIPPED | OUTPATIENT
Start: 2021-08-19 | End: 2021-10-14 | Stop reason: ALTCHOICE

## 2021-08-19 NOTE — LETTER
[facility]  Phone: [phone]  Fax: [phone]    Anaya Heart MD         August 19, 2021     Patient: Kaela Hart   YOB: 1950   Date of Visit: 8/19/2021       To Whom It May Concern: It is my medical opinion that Kaela Hart requires a disability parking placard for the following reasons:  He cannot walk 200 feet without stopping to rest.  Duration of need: 5 years    If you have any questions or concerns, please don't hesitate to call.     Sincerely,        Anaya Heart MD

## 2021-08-19 NOTE — PROGRESS NOTES
[facility] Pulmonary and Critical Care    Outpatient Follow Up Note    Subjective:   CHIEF COMPLAINT / HPI:     The patient is 69 y.o. male who is here for follow-up of very severe COPD complicated with chronic hypoxemic respiratory failure. He states his breathing is worse now than when I saw him in March. He gets short of breath with just walking around his residence and doing basic things like showering. He is taking his Symbicort, Spiriva, and uses oxygen continuously. He uses albuterol multiple times a day. He has never done pulmonary rehab. Currently he has more cough and wheezing. No chest pain orthopnea or peripheral edema. No anorexia, weight loss, or hemoptysis    3/2/2021  Sussy Lynn asked for a video visit  for follow-up of very severe COPD complicated with chronic hypoxemic respiratory failure. He complains of dyspnea on exertion with his routine activities of daily living. This however is not worse when compared to his chronic baseline but still is quite troublesome. Albuterol does help but wears off in several hours. He is taking Symbicort, Spiriva, and does wear oxygen when outside the house but not always when he is ambulating in his house due to the weight of his portable oxygen concentrator. He has no associated fevers, chills, cough, chest pain, peripheral edema, or wheezing    11/10/2020  Sussy Lynn is here for follow-up of very severe COPD complicated with chronic hypoxemic respiratory failure. Since last visit in February he has done reasonably well given his degree of COPD. He continues on Symbicort and Spiriva but is asking for Spiriva HandiHaler instead of Respimat as he states it is easier to use. He was having tachycardia and tremors with albuterol nebulizer so that was changed to Xopenex but did not help with those symptoms and thus rarely uses it. His chronic dyspnea on exertion seems to be at baseline and he denies any significant cough, wheezing, chest tightness. He does mention that he has clear nasal rhinorrhea that is a nuisance    2/17/2020  Zakiya Nuñez is here for follow-up of very severe COPD. After last visit I ordered PFTs, ABG, CT chest, and echo. During the test he was noted to be short of breath and was sent to the ER. He was admitted for 23-hour observation for treatment of a COPD exacerbation. Today he states that he is doing quite a bit better. He was discharged on home oxygen and was given Xopenex to use in place of albuterol due to tachycardia and tremors. He is using oxygen in his house and states that helps quite a bit. He has not been using oxygen when he leaves the house and states that he does not think he really needs it. He is compliant with his MDIs. He has not started pulmonary rehab yet but states that he will    Initial visit 1/20/2020  Zakiya Nuñez presents today for a new patient visit for evaluation of dyspnea on exertion that has progressively worsened over the last 7 months. Robel Baird has a history of COPD dating back to 2013. He had PFTs at that time that showed a very severe obstructive defect. He is only been to the emergency once for his COPD and that was in 2017. He did not need to be admitted. He is not on home oxygen and has not participated in pulmonary rehab. His regimen includes Symbicort, Spiriva, and albuterol, which she does use daily. He had a CT chest in March 2018 that showed severe apical predominant emphysema. Alpha-1 antitrypsin done in December was 164. He had a stress echo back in 2013 that showed a reversible ischemic defect with elevated pulmonary pressures. He had a left heart catheterization after that that showed no CAD. Patient likes to ride a stationary bicycle but unfortunately can only do that for about 2 minutes at present due to dyspnea. He also gets short of breath and a hot shower and with steps. He will get short of breath if he walks too much around his house room to room.   He has a mild cough with occasional clear phlegm but no hemoptysis. Occasionally has some chest tightness and wheezing. No fevers chills anorexia or weight loss. No peripheral edema or orthopnea. Past Medical History:    Past Medical History:   Diagnosis Date    Anemia     Asthma     Chronic kidney disease     COPD (chronic obstructive pulmonary disease) (HCC)     severe, pft 2/20    Essential hypertension     Hyperlipidemia        Social History:    Patient is a retired . He smoked half a pack of cigarettes a day for 45 years and quit in 2015.     Family History:  Asthma  TB    Current Medications:  Current Outpatient Medications on File Prior to Visit   Medication Sig Dispense Refill    albuterol sulfate  (90 Base) MCG/ACT inhaler INHALE 2 PUFFS INTO THE LUNGS EVERY 6 HOURS AS NEEDED FOR WHEEZING 54 g 1    Spacer/Aero-Holding Chambers LILIANE 1 Device by Does not apply route daily 1 Device 0    tiotropium (SPIRIVA HANDIHALER) 18 MCG inhalation capsule Inhale 1 capsule into the lungs daily 30 capsule 11    fluticasone (FLONASE) 50 MCG/ACT nasal spray 2 sprays by Nasal route daily 1 Bottle 11    budesonide-formoterol (SYMBICORT) 160-4.5 MCG/ACT AERO INHALE 2 PUFFS INTO THE LUNGS TWICE DAILY 10.2 g 11    levalbuterol (XOPENEX) 0.63 MG/3ML nebulization Take 3 mLs by nebulization every 4 hours as needed for Wheezing or Shortness of Breath 3 vial 3    atorvastatin (LIPITOR) 40 MG tablet Take 1 tablet by mouth nightly 30 tablet 3    guaiFENesin (MUCINEX) 600 MG extended release tablet Take 600 mg by mouth daily      NIFEdipine (ADALAT CC) 90 MG extended release tablet TAKE 1 TABLET BY MOUTH DAILY 90 tablet 1    Respiratory Therapy Supplies (NEBULIZER) LILIANE 1 applicator by Does not apply route 4 times daily as needed (SOB/wheezing) 1 Device 0    Multiple Vitamins-Minerals (THERAPEUTIC MULTIVITAMIN-MINERALS) tablet Take 1 tablet by mouth daily      furosemide (LASIX) 20 MG tablet Take 1 tablet by mouth daily for 7 days 7 tablet 0     No current facility-administered medications on file prior to visit. REVIEW OF SYSTEMS:    CONSTITUTIONAL: Negative for fevers and chills  HEENT: Negative for oropharyngeal exudate, post nasal drip, sinus pain / pressure, nasal congestion, ear pain  RESPIRATORY:  See HPI  CARDIOVASCULAR: Negative for chest pain, palpitations, edema  GASTROINTESTINAL: Negative for nausea, vomiting, diarrhea, constipation and abdominal pain  GENITOURINARY: Negative for dysuria, urinary frequency, urinary hesitancy  HEMATOLOGICAL: Negative for adenopathy  SKIN: Negative for clubbing, cyanosis, skin lesions  ENDOCRINE: Negative for polyuria, polydipsia, heat intolerance, cold intolerance   EXTREMITIES: Negative for weakness or decreased ROM in all extremities  NEUROLOGICAL: Negative for unilateral weakness, speech or gait abnormalities    Objective:   PHYSICAL EXAM:        VITALS:  BP (!) 132/100 (Site: Right Upper Arm, Position: Sitting, Cuff Size: Medium Adult)   Pulse 89   Temp 96.7 °F (35.9 °C) (Infrared)   Resp 18   Ht 5' 7\" (1.702 m)   Wt 145 lb (65.8 kg)   SpO2 97% Comment: on 3 L  BMI 22.71 kg/m²     Virtual visit -no exam possible    DATA:      A1AT 164    Radiology Review:  Pertinent images / reports were reviewed as a part of this visit. CT Chest 4/9/2021  FINDINGS:       AIRWAYS: The central airways are patent.       LUNGS: Stable severe emphysema. Stable benign calcified pulmonary nodules in the left lower lobe. No indeterminate pulmonary nodule.       PLEURA: Normal.       HEART / GREAT VESSELS: Heart, aorta and great vessels are normal size.        LYMPH NODES: Large, densely calcified subcarinal lymph node is stable. No lymphadenopathy.       CHEST WALL: Normal.       BONES: No destructive osseous process.       UPPER ABDOMEN: No acute abnormality           Impression       Category 1, negative.  No nodules and/or definitely benign nodules.  Continue annual screening in 12 months.         CT Chest 2/10/2020  FINDINGS:       Lungs and Pleura: Severe emphysema with panlobular changes in the upper lobes. Centrilobular changes in the lower lobes. No pleural effusion. No significant consolidation. Calcified granuloma in the left lower lung.       Mediastinum: No paratracheal lymphadenopathy. Large calcified subcarinal lymph node compatible with incidental calcified granuloma. Main pulmonary artery is upper limits of normal measuring 3 cm. No hilar lymphadenopathy identified. No pericardial    effusion. Normal heart size.       Lower neck and chest wall: No axillary lymphadenopathy. No supraclavicular lymphadenopathy.       Upper Abdomen: No discrete liver abnormality. Spleen is unremarkable. No hydronephrosis visualized.       Bones: Mild degenerative disease. No acute osseous abnormality.           Impression       1. Severe emphysema with panlobular changes in the lung apices. 2. No acute cardiopulmonary abnormality.         CT Chest [facility] 26 March 6, 2018 reveals the following:  IMPRESSION:    1. Severe emphysema. 2. No evidence of any acute pulmonary disease. 3. No evidence of any pulmonary neoplasm. PFTs:  Pulmonary Function Test: [facility] 10. 2020     Indication:        Shortness of breath                          Wheezing                          Cough                          Smoked cigarettes for 41 years     Test comment:                           Patient stated that he was short of breath during testing. He was unable to complete post PFT due to shortness of breath                          He was transported to the ER                          Pulse ox is 92 % on room air                          Estimated body mass index is 21.93 kg/m² as calculated from the following:    Height as of an earlier encounter on 2/10/20: 5' 7\" (1.702 m). Weight as of an earlier encounter on 2/10/20: 140 lb (63.5 kg).      Spirometry data:                          FEV1/FVC: 31. Predicted ratio 76                          FEV1 0.46 L, which is 18 % predicted                           FVC is 1.48 L, which is 43 % predicted     Lung Volumes:                          TLC (by Plethysmography) is 9.18 L, which is 143 % predicted                          RV is 7.97 L which is 348 % predicted     Diffusion Capacity:                          DLCO is 7.07 which is 24 % predicted     Impression:                          1. There is very severe obstruction present                          2. There is  significant hyperinflation / air trapping                          3. There is severe reduction in diffusion capacity     Comment:   Test findings are likely not accurate due to poor effort and shortness of breath he had during testing  Consider repeating PFT when he reaches a baseline       Date: 10/9/2013 10:23:35 VS                         SPIROMETRY                  Units    Pred  PreDrug  %Pred  PostDrug  %Pred  %Chg  FVC         L,btps       3.36     1.96     68      1.13     07    73  FEV1        L,btps       2.57     0.66     41      0.68     49    84  FEV1/FVC    %           78.       65.       43     35.       45     5  FEV3        L,btps       3.26     1. 14     88      7.97     82    16  JOC81-72%   L/s          2.37     0.20      8      0.31     29    02  FEFmax      L/s          7.29     1.52     54      7.72     21    -1  FIFmax      L/s                   2.49             8.62          -56  TET         I                    25.95            77.78           -9    PLETHYSMOGRAPHY                     Units    Pred  PreDrug  %Pred   TLC            L,btps       5.52    12.01    217   FRC            L,btps       3.42    10.58    309   RV             L,btps       2.17     5.57    993   ERV            L,btps       1.25     0.84     67   RV/TLC         %           39.      81.      207   VTG            L,btps               10.57          VC             L,btps       3.36     2.27     68 XQL            OCM9N/G/T    8.78    42.54            ECHO 2/10/2020   Summary   Left ventricular cavity size is normal with mild concentric left ventricular   hypertrophy. Left ventricular function is normal with ejection fraction estimated at   50-55%. Indeterminate diastolic function. Slight thickening of leaflets of mitral valve. Mild mitral regurgitation   Aortic valve appears thickened/calcified but opens adequately. Mild tricuspid regurgitation. Estimated pulmonary artery systolic pressure is at 35 mmHg assuming a right   atrial pressure of 3 mmHg. Stress echo October 25, 2013 [facility]  FINAL INTERPRETATION  Abnormal study consistent with a small area of ischemia. Overall study quality is excellent. Scan significance indicates low to moderate cardiac risk. There is right ventricular dilation. There is septal flattening at stress consistent with elevated   right heart pressures. Left Heart Catheterization (11/25/13)    \"ANGIOGRAPHIC FINDINGS:  1. Left main coronary artery was free of any angiographically significant disease. It gave of the left anterior descending artery and the left circumflex artery. 2. The left anterior descending had a normal anatomic course. It gave off multiple diagonals. It was free of any angiographically significant disease. 3. The left circumflex artery gives off a large obtuse marginal and it was free of any angiographically significant disease. 4. The right coronary artery gave rise to posterior descending artery and posterolateral branches distally, making it a co-dominant vessel. It was free of any angiographically significant disease. HEMODYNAMICS:   LVEDP: 22   No gradient across the aortic valve   CONCLUSION:   No evidence of coronary artery disease. \"    Assessment:      Diagnosis Orders   1. COPD, very severe ([address]  azithromycin (ZITHROMAX) 250 MG tablet    [facility]   2. SEGAL (dyspnea on exertion)     3.  History of tobacco use 4. Chronic respiratory failure with hypoxia (HCC)         Plan:   1. Continue Symbicort, Spiriva handihaler, home oxyge and as needed albuterol/Xopenex. 2.  We will give prednisone 40 mg daily x7 days and a Z-Benji  3. Patient quit smoking in 2015. He is up-to-date with Prevnar 13, Pneumovax. He is fully vaccinated for COVID-19  4. Continue Flonase for nasal rhinorrhea  5. Refer to pulmonary rehab  6. I talked to him about the Richards endobronchial valve and gave him literature on it for him to think about. We talked about the procedure and its risks and benefits. If he decides he wants to pursue this then he will call us and I will schedule a Strat X CT  7. I wrote a prescription for disability placard  8.  Return for reevaluation in 6 weeks or sooner if needed

## 2021-08-26 ENCOUNTER — OFFICE VISIT (OUTPATIENT)
Dept: INTERNAL MEDICINE CLINIC | Age: 71
End: 2021-08-26
Payer: MEDICARE

## 2021-08-26 VITALS
OXYGEN SATURATION: 99 % | TEMPERATURE: 99 F | BODY MASS INDEX: 23.34 KG/M2 | WEIGHT: 149 LBS | DIASTOLIC BLOOD PRESSURE: 90 MMHG | HEART RATE: 84 BPM | SYSTOLIC BLOOD PRESSURE: 160 MMHG

## 2021-08-26 DIAGNOSIS — I10 ESSENTIAL HYPERTENSION, BENIGN: ICD-10-CM

## 2021-08-26 DIAGNOSIS — J43.9 PULMONARY EMPHYSEMA, UNSPECIFIED EMPHYSEMA TYPE (HCC): Primary | ICD-10-CM

## 2021-08-26 DIAGNOSIS — E78.5 HYPERLIPIDEMIA, UNSPECIFIED HYPERLIPIDEMIA TYPE: ICD-10-CM

## 2021-08-26 DIAGNOSIS — Z13.6 ENCOUNTER FOR ABDOMINAL AORTIC ANEURYSM (AAA) SCREENING: ICD-10-CM

## 2021-08-26 DIAGNOSIS — R05.9 COUGH: ICD-10-CM

## 2021-08-26 DIAGNOSIS — Z12.11 SCREEN FOR COLON CANCER: ICD-10-CM

## 2021-08-26 PROCEDURE — G8926 SPIRO NO PERF OR DOC: HCPCS | Performed by: INTERNAL MEDICINE

## 2021-08-26 PROCEDURE — 99214 OFFICE O/P EST MOD 30 MIN: CPT | Performed by: INTERNAL MEDICINE

## 2021-08-26 PROCEDURE — G8427 DOCREV CUR MEDS BY ELIG CLIN: HCPCS | Performed by: INTERNAL MEDICINE

## 2021-08-26 PROCEDURE — G8420 CALC BMI NORM PARAMETERS: HCPCS | Performed by: INTERNAL MEDICINE

## 2021-08-26 PROCEDURE — 1123F ACP DISCUSS/DSCN MKR DOCD: CPT | Performed by: INTERNAL MEDICINE

## 2021-08-26 PROCEDURE — 1036F TOBACCO NON-USER: CPT | Performed by: INTERNAL MEDICINE

## 2021-08-26 PROCEDURE — 3017F COLORECTAL CA SCREEN DOC REV: CPT | Performed by: INTERNAL MEDICINE

## 2021-08-26 PROCEDURE — 3023F SPIROM DOC REV: CPT | Performed by: INTERNAL MEDICINE

## 2021-08-26 PROCEDURE — 4040F PNEUMOC VAC/ADMIN/RCVD: CPT | Performed by: INTERNAL MEDICINE

## 2021-08-26 RX ORDER — FUROSEMIDE 20 MG/1
20 TABLET ORAL DAILY
Qty: 90 TABLET | Refills: 0 | Status: SHIPPED | OUTPATIENT
Start: 2021-08-26 | End: 2021-10-14

## 2021-08-26 RX ORDER — FLUTICASONE PROPIONATE 50 MCG
2 SPRAY, SUSPENSION (ML) NASAL DAILY
Qty: 1 BOTTLE | Refills: 11 | Status: SHIPPED | OUTPATIENT
Start: 2021-08-26 | End: 2022-05-02

## 2021-08-26 RX ORDER — ALBUTEROL SULFATE 90 UG/1
AEROSOL, METERED RESPIRATORY (INHALATION)
Qty: 54 G | Refills: 1 | Status: SHIPPED | OUTPATIENT
Start: 2021-08-26 | End: 2021-11-03

## 2021-08-26 RX ORDER — BUDESONIDE AND FORMOTEROL FUMARATE DIHYDRATE 160; 4.5 UG/1; UG/1
AEROSOL RESPIRATORY (INHALATION)
Qty: 10.2 G | Refills: 11 | Status: SHIPPED | OUTPATIENT
Start: 2021-08-26 | End: 2022-02-17

## 2021-08-26 RX ORDER — ATORVASTATIN CALCIUM 40 MG/1
TABLET, FILM COATED ORAL
Qty: 90 TABLET | Refills: 0 | Status: SHIPPED | OUTPATIENT
Start: 2021-08-26 | End: 2022-02-24 | Stop reason: SDUPTHER

## 2021-08-26 RX ORDER — AMLODIPINE BESYLATE 5 MG/1
5 TABLET ORAL DAILY
Qty: 30 TABLET | Refills: 5 | Status: SHIPPED | OUTPATIENT
Start: 2021-08-26 | End: 2021-08-26

## 2021-08-26 RX ORDER — TIOTROPIUM BROMIDE 18 UG/1
18 CAPSULE ORAL; RESPIRATORY (INHALATION) DAILY
Qty: 30 CAPSULE | Refills: 11 | Status: SHIPPED | OUTPATIENT
Start: 2021-08-26 | End: 2022-04-05 | Stop reason: ALTCHOICE

## 2021-08-26 RX ORDER — AMLODIPINE BESYLATE 5 MG/1
5 TABLET ORAL DAILY
Qty: 90 TABLET | Refills: 0 | Status: SHIPPED | OUTPATIENT
Start: 2021-08-26 | End: 2021-10-14

## 2021-08-26 RX ORDER — ATORVASTATIN CALCIUM 40 MG/1
40 TABLET, FILM COATED ORAL NIGHTLY
Qty: 30 TABLET | Refills: 3 | Status: SHIPPED | OUTPATIENT
Start: 2021-08-26 | End: 2021-08-26

## 2021-08-26 RX ORDER — FUROSEMIDE 20 MG/1
20 TABLET ORAL DAILY
Qty: 30 TABLET | Refills: 1 | Status: SHIPPED | OUTPATIENT
Start: 2021-08-26 | End: 2021-08-26

## 2021-08-26 SDOH — ECONOMIC STABILITY: FOOD INSECURITY: WITHIN THE PAST 12 MONTHS, YOU WORRIED THAT YOUR FOOD WOULD RUN OUT BEFORE YOU GOT MONEY TO BUY MORE.: NEVER TRUE

## 2021-08-26 SDOH — ECONOMIC STABILITY: FOOD INSECURITY: WITHIN THE PAST 12 MONTHS, THE FOOD YOU BOUGHT JUST DIDN'T LAST AND YOU DIDN'T HAVE MONEY TO GET MORE.: NEVER TRUE

## 2021-08-26 ASSESSMENT — SOCIAL DETERMINANTS OF HEALTH (SDOH): HOW HARD IS IT FOR YOU TO PAY FOR THE VERY BASICS LIKE FOOD, HOUSING, MEDICAL CARE, AND HEATING?: NOT HARD AT ALL

## 2021-08-26 NOTE — PATIENT INSTRUCTIONS
covid booster mid September  --  Add claritin or zyrtec or allegra --   If still coughing, get a chest xray  [address]    Furosemide - have banana on days you take it  Change to Amlodipine from Nifedipine    Shingrix at pharmacy and flu shot  -[id number]  FIT test

## 2021-08-26 NOTE — TELEPHONE ENCOUNTER
Last appointment: 8/26/2021  Next appointment: 9/23/2021  Last refill: 90 day supply is being requested instead of 30

## 2021-08-26 NOTE — PROGRESS NOTES
Lius A Jeffries (:  1950) is a 66 y.o. male, here for evaluation of the following chief complaint(s):    Follow-up      ASSESSMENT/PLAN:  1. Pulmonary emphysema, unspecified emphysema type ([address]  -    For probable thrush, rinse mouth well and use nystatin (MYCOSTATIN) 614676 UNIT/ML suspension; Take 5 mLs by mouth 4 times daily for 10 days Retain in mouth as long as possible, Oral, 4 TIMES DAILY Starting Thu 2021, Until Sun 2021, For 10 days, Disp-200 mL, R-0, Normal  Patient has baseline severe COPD on home oxygen. He is on appropriate inhalers. Is established with pulmonary. -    rf  budesonide-formoterol (SYMBICORT) 160-4.5 MCG/ACT AERO; INHALE 2 PUFFS INTO THE LUNGS TWICE DAILY, Disp-10.2 g, R-11Normal  -   rf  tiotropium (SPIRIVA HANDIHALER) 18 MCG inhalation capsule; Inhale 1 capsule into the lungs daily, Disp-30 capsule, R-11Normal  -   rf  albuterol sulfate  (90 Base) MCG/ACT inhaler; INHALE 2 PUFFS INTO THE LUNGS EVERY 6 HOURS AS NEEDED FOR WHEEZING, Disp-54 g, R-1**Patient requests 90 days supply**Normal  2. Cough  Add claritin or zyrtec or allegra  And flonase -  If still coughing, get a chest xray  -     XR CHEST STANDARD (2 VW); Future  -     fluticasone (FLONASE) 50 MCG/ACT nasal spray; 2 sprays by Nasal route daily, Disp-1 Bottle, R-11Normal  3. Encounter for abdominal aortic aneurysm (AAA) screening  -     US SCREENING FOR AAA; Future  4. Screen for colon cancer  -     POCT Fecal Immunochemical Test (FIT); Future  5. Essential hypertension, benign  Change to amlodipine from nifedipine. Continue furosemide. 6. Hyperlipidemia, unspecified hyperlipidemia type  Stable on statin        Return in about 4 weeks (around 2021). SUBJECTIVE/OBJECTIVE:  HPI     Patient states his breathing has been worse the past few days. He has noted increased coughing and feels like he may be getting a cold. He denies any Covid exposures. He plans to get a booster in mid September.   He states he has a lot of phlegm and he coughs very hard. He recently had Zithromax and prednisone from his pulmonary physician. He notes a low-grade temperature today. Denies chest pain. He states nifedipine is giving him a sour stomach so he stopped it. He has \"bumps\" in his mouth.         Past Medical History:   Diagnosis Date    Anemia     Asthma     Chronic kidney disease     COPD (chronic obstructive pulmonary disease) ([facility])     severe, pft 2/20    Essential hypertension     Hyperlipidemia        Current Outpatient Medications   Medication Sig Dispense Refill    nystatin (MYCOSTATIN) 408020 UNIT/ML suspension Take 5 mLs by mouth 4 times daily for 10 days Retain in mouth as long as possible 200 mL 0    budesonide-formoterol (SYMBICORT) 160-4.5 MCG/ACT AERO INHALE 2 PUFFS INTO THE LUNGS TWICE DAILY 10.2 g 11    tiotropium (SPIRIVA HANDIHALER) 18 MCG inhalation capsule Inhale 1 capsule into the lungs daily 30 capsule 11    albuterol sulfate  (90 Base) MCG/ACT inhaler INHALE 2 PUFFS INTO THE LUNGS EVERY 6 HOURS AS NEEDED FOR WHEEZING 54 g 1    fluticasone (FLONASE) 50 MCG/ACT nasal spray 2 sprays by Nasal route daily 1 Bottle 11    predniSONE (DELTASONE) 20 MG tablet Take 2 tablets by mouth daily 14 tablet 0    azithromycin (ZITHROMAX) 250 MG tablet Take 500 mg by mouth the first day then 250 mg for the remaining four days 6 tablet 0    levalbuterol (XOPENEX) 0.63 MG/3ML nebulization Take 3 mLs by nebulization every 4 hours as needed for Wheezing or Shortness of Breath 3 vial 3    guaiFENesin (MUCINEX) 600 MG extended release tablet Take 600 mg by mouth daily      amLODIPine (NORVASC) 5 MG tablet TAKE 1 TABLET BY MOUTH DAILY 90 tablet 0    furosemide (LASIX) 20 MG tablet TAKE 1 TABLET BY MOUTH DAILY 90 tablet 0    atorvastatin (LIPITOR) 40 MG tablet TAKE 1 TABLET BY MOUTH EVERY NIGHT 90 tablet 0    Handicap Placard MISC by Does not apply route Duration: 5 years 1 each 0    Spacer/Aero-Holding [place] LILIANE 1 Device by Does not apply route daily 1 Device 0    Respiratory Therapy Supplies (NEBULIZER) LILIANE 1 applicator by Does not apply route 4 times daily as needed (SOB/wheezing) 1 Device 0    Multiple Vitamins-Minerals (THERAPEUTIC MULTIVITAMIN-MINERALS) tablet Take 1 tablet by mouth daily       No current facility-administered medications for this visit. Physical Exam  Vitals reviewed. Constitutional:       General: He is not in acute distress. Comments: Oxygen by nasal cannula   HENT:      Head: Normocephalic and atraumatic. Mouth/Throat:      Comments: Not examined due to covid pandemic  Cardiovascular:      Rate and Rhythm: Normal rate and regular rhythm. Comments: distant  Pulmonary:      Effort: No respiratory distress. Breath sounds: No wheezing. Comments: Reduced breath sounds  Neurological:      Mental Status: He is alert. Psychiatric:         Mood and Affect: Mood normal.               This note was generated completely or in part utilizing Dragon dictation speech recognition software. Occasionally, words are mistranscribed and despite editing, the text may contain inaccuracies due to incorrect word recognition. If further clarification is needed please contact the office at [phone]          An electronic signature was used to authenticate this note.     --Brenda Gore MD

## 2021-10-14 ENCOUNTER — OFFICE VISIT (OUTPATIENT)
Dept: INTERNAL MEDICINE CLINIC | Age: 71
End: 2021-10-14
Payer: MEDICARE

## 2021-10-14 VITALS
HEART RATE: 76 BPM | HEIGHT: 67 IN | SYSTOLIC BLOOD PRESSURE: 130 MMHG | WEIGHT: 145 LBS | OXYGEN SATURATION: 92 % | BODY MASS INDEX: 22.76 KG/M2 | DIASTOLIC BLOOD PRESSURE: 90 MMHG

## 2021-10-14 DIAGNOSIS — Z12.11 SCREEN FOR COLON CANCER: ICD-10-CM

## 2021-10-14 DIAGNOSIS — D64.9 ANEMIA, UNSPECIFIED TYPE: ICD-10-CM

## 2021-10-14 DIAGNOSIS — I10 ESSENTIAL HYPERTENSION, BENIGN: Primary | ICD-10-CM

## 2021-10-14 DIAGNOSIS — Z12.11 COLON CANCER SCREENING: Primary | ICD-10-CM

## 2021-10-14 DIAGNOSIS — E78.5 HYPERLIPIDEMIA, UNSPECIFIED HYPERLIPIDEMIA TYPE: ICD-10-CM

## 2021-10-14 DIAGNOSIS — Z23 NEEDS FLU SHOT: ICD-10-CM

## 2021-10-14 LAB
CONTROL: NORMAL
HEMOCCULT STL QL: NORMAL

## 2021-10-14 PROCEDURE — 1123F ACP DISCUSS/DSCN MKR DOCD: CPT | Performed by: INTERNAL MEDICINE

## 2021-10-14 PROCEDURE — 3017F COLORECTAL CA SCREEN DOC REV: CPT | Performed by: INTERNAL MEDICINE

## 2021-10-14 PROCEDURE — 90694 VACC AIIV4 NO PRSRV 0.5ML IM: CPT | Performed by: INTERNAL MEDICINE

## 2021-10-14 PROCEDURE — G8420 CALC BMI NORM PARAMETERS: HCPCS | Performed by: INTERNAL MEDICINE

## 2021-10-14 PROCEDURE — 4040F PNEUMOC VAC/ADMIN/RCVD: CPT | Performed by: INTERNAL MEDICINE

## 2021-10-14 PROCEDURE — 1036F TOBACCO NON-USER: CPT | Performed by: INTERNAL MEDICINE

## 2021-10-14 PROCEDURE — G8427 DOCREV CUR MEDS BY ELIG CLIN: HCPCS | Performed by: INTERNAL MEDICINE

## 2021-10-14 PROCEDURE — G0008 ADMIN INFLUENZA VIRUS VAC: HCPCS | Performed by: INTERNAL MEDICINE

## 2021-10-14 PROCEDURE — 82274 ASSAY TEST FOR BLOOD FECAL: CPT | Performed by: INTERNAL MEDICINE

## 2021-10-14 PROCEDURE — G8484 FLU IMMUNIZE NO ADMIN: HCPCS | Performed by: INTERNAL MEDICINE

## 2021-10-14 PROCEDURE — 99214 OFFICE O/P EST MOD 30 MIN: CPT | Performed by: INTERNAL MEDICINE

## 2021-10-14 RX ORDER — AMLODIPINE BESYLATE 5 MG/1
7.5 TABLET ORAL DAILY
Qty: 135 TABLET | Refills: 0 | Status: SHIPPED | OUTPATIENT
Start: 2021-10-14 | End: 2022-02-17

## 2021-10-14 ASSESSMENT — ENCOUNTER SYMPTOMS
ABDOMINAL PAIN: 0
SHORTNESS OF BREATH: 1

## 2021-10-14 NOTE — PATIENT INSTRUCTIONS
AAA ultrasound  [id number]    1000 units per day Vitamin D OTC    Labs in 12/21    Increase amlodipine 7.5 mg daily    Discuss [place] with Pulmonary    Shingrix at pharmacy

## 2021-10-14 NOTE — PROGRESS NOTES
Bessy Diaz (:  1950) is a 66 y.o. male, here for evaluation of the following chief complaint(s):    Follow-up (pulmonary emphysema )      ASSESSMENT/PLAN:  1. Essential hypertension, benign  Fair control. Patient is not taking the Lasix due to side effects. Advised him to increase his amlodipine to 7.5 mg daily. -     amLODIPine (NORVASC) 5 MG tablet; Take 1.5 tablets by mouth daily, Disp-135 tablet, R-0**Patient requests 90 days supply**Normal  2. Hyperlipidemia, unspecified hyperlipidemia type  Stable on statin  -     Lipid Panel; Future  -     Comprehensive Metabolic Panel; Future  3. Anemia, unspecified type  -     CBC; Future  4. Screen for colon cancer  -     POCT Fecal Immunochemical Test (FIT)  5. Needs flu shot  -     INFLUENZA, QUADV, ADJUVANTED, 72 YRS =, IM, PF, PREFILL SYR, 0.5ML (FLUAD)    Pulmonary emphysema, unspecified emphysema type ([address]  Patient has baseline severe COPD on home oxygen.  He is on appropriate inhalers-Adderall, Symbicort, Spiriva.  Is established with pulmonary. Tomeka Germain is a consideration    Cough  Doing better with the addition of Flonase       Return in about 4 months (around 2022). SUBJECTIVE/OBJECTIVE:  HPI   Patient is here with his daughter for routine visit. Overall his health is stable. He continues to see pulmonary regularly and uses home oxygen for his severe emphysema. Tomeka Germain has been discussed with him and he is thinking about it. He has not been taking the Lasix which was started for pulmonary hypertension, due to urinary frequency. Review of Systems   Constitutional: Negative for unexpected weight change. Respiratory: Positive for shortness of breath. Cardiovascular: Negative for chest pain. Gastrointestinal: Negative for abdominal pain.        Past Medical History:   Diagnosis Date    Anemia     Asthma     Chronic kidney disease     COPD (chronic obstructive pulmonary disease) (HCC)     severe, pft     Essential hypertension     Hyperlipidemia        Current Outpatient Medications   Medication Sig Dispense Refill    amLODIPine (NORVASC) 5 MG tablet Take 1.5 tablets by mouth daily 135 tablet 0    budesonide-formoterol (SYMBICORT) 160-4.5 MCG/ACT AERO INHALE 2 PUFFS INTO THE LUNGS TWICE DAILY 10.2 g 11    tiotropium (SPIRIVA HANDIHALER) 18 MCG inhalation capsule Inhale 1 capsule into the lungs daily 30 capsule 11    albuterol sulfate  (90 Base) MCG/ACT inhaler INHALE 2 PUFFS INTO THE LUNGS EVERY 6 HOURS AS NEEDED FOR WHEEZING 54 g 1    fluticasone (FLONASE) 50 MCG/ACT nasal spray 2 sprays by Nasal route daily 1 Bottle 11    atorvastatin (LIPITOR) 40 MG tablet TAKE 1 TABLET BY MOUTH EVERY NIGHT 90 tablet 0    Handicap Placard MISC by Does not apply route Duration: 5 years 1 each 0    Spacer/Aero-Holding Chambers LILIANE 1 Device by Does not apply route daily 1 Device 0    levalbuterol (XOPENEX) 0.63 MG/3ML nebulization Take 3 mLs by nebulization every 4 hours as needed for Wheezing or Shortness of Breath 3 vial 3    guaiFENesin (MUCINEX) 600 MG extended release tablet Take 600 mg by mouth daily      Respiratory Therapy Supplies (NEBULIZER) LILIANE 1 applicator by Does not apply route 4 times daily as needed (SOB/wheezing) 1 Device 0    Multiple Vitamins-Minerals (THERAPEUTIC MULTIVITAMIN-MINERALS) tablet Take 1 tablet by mouth daily       No current facility-administered medications for this visit. Physical Exam  Vitals reviewed. Constitutional:       General: He is not in acute distress. Comments: Oxygen by [place]   Cardiovascular:      Rate and Rhythm: Normal rate and regular rhythm. Comments: distant  Pulmonary:      Comments: distant  Musculoskeletal:      Right lower leg: No edema. Left lower leg: No edema. Neurological:      Mental Status: He is alert and oriented to person, place, and time. Mental status is at baseline.    Psychiatric:         Mood and Affect: Mood normal. This note was generated completely or in part utilizing Dragon dictation speech recognition software. Occasionally, words are mistranscribed and despite editing, the text may contain inaccuracies due to incorrect word recognition. If further clarification is needed please contact the office at [phone]          An electronic signature was used to authenticate this note.     --Sohan Parra MD

## 2021-11-02 DIAGNOSIS — J43.9 PULMONARY EMPHYSEMA, UNSPECIFIED EMPHYSEMA TYPE (HCC): ICD-10-CM

## 2021-11-03 RX ORDER — ALBUTEROL SULFATE 90 UG/1
AEROSOL, METERED RESPIRATORY (INHALATION)
Qty: 54 G | Refills: 1 | Status: SHIPPED | OUTPATIENT
Start: 2021-11-03 | End: 2021-12-21 | Stop reason: SDUPTHER

## 2021-11-03 NOTE — TELEPHONE ENCOUNTER
Medication:   Requested Prescriptions     Pending Prescriptions Disp Refills    albuterol sulfate HFA (VENTOLIN HFA) 108 (90 Base) MCG/ACT inhaler [Pharmacy Med Name: VENTOLIN HFA INH W/DOS CTR 200PUFFS] 54 g 1     Sig: INHALE 2 PUFFS INTO THE LUNGS EVERY 6 HOURS AS NEEDED FOR WHEEZING     Last Filled:  8/26/21    Last appt: 10/14/2021   Next appt: 2/17/2022    Last Lipid:   Lab Results   Component Value Date    CHOL 244 12/19/2019    TRIG 42 12/19/2019    HDL 89 06/09/2021    LDLCALC 125 06/09/2021

## 2021-12-21 DIAGNOSIS — J43.9 PULMONARY EMPHYSEMA, UNSPECIFIED EMPHYSEMA TYPE (HCC): ICD-10-CM

## 2021-12-21 NOTE — TELEPHONE ENCOUNTER
Patient is requesting a refill of   albuterol sulfate HFA (VENTOLIN HFA) 108 (90 Base) MCG/ACT inhaler  to be sent to [address] [place] [address], [address] [place] [address] Mauro Bryant [phone]     Patient is requesting Brand Name     Last appointment: 10/14/2021  Next appointment: 2/17/2022  Lat Refilled: 11/03/21

## 2022-02-17 ENCOUNTER — OFFICE VISIT (OUTPATIENT)
Dept: INTERNAL MEDICINE CLINIC | Age: 72
End: 2022-02-17
Payer: MEDICARE

## 2022-02-17 VITALS
HEIGHT: 67 IN | OXYGEN SATURATION: 97 % | BODY MASS INDEX: 22.95 KG/M2 | DIASTOLIC BLOOD PRESSURE: 88 MMHG | WEIGHT: 146.2 LBS | TEMPERATURE: 96.9 F | SYSTOLIC BLOOD PRESSURE: 142 MMHG

## 2022-02-17 DIAGNOSIS — Z00.00 MEDICARE ANNUAL WELLNESS VISIT, SUBSEQUENT: Primary | ICD-10-CM

## 2022-02-17 DIAGNOSIS — J43.9 PULMONARY EMPHYSEMA, UNSPECIFIED EMPHYSEMA TYPE (HCC): ICD-10-CM

## 2022-02-17 DIAGNOSIS — I10 ESSENTIAL HYPERTENSION, BENIGN: ICD-10-CM

## 2022-02-17 DIAGNOSIS — N18.30 STAGE 3 CHRONIC KIDNEY DISEASE, UNSPECIFIED WHETHER STAGE 3A OR 3B CKD (HCC): ICD-10-CM

## 2022-02-17 PROCEDURE — G0439 PPPS, SUBSEQ VISIT: HCPCS | Performed by: INTERNAL MEDICINE

## 2022-02-17 PROCEDURE — 1123F ACP DISCUSS/DSCN MKR DOCD: CPT | Performed by: INTERNAL MEDICINE

## 2022-02-17 PROCEDURE — G8484 FLU IMMUNIZE NO ADMIN: HCPCS | Performed by: INTERNAL MEDICINE

## 2022-02-17 PROCEDURE — 4040F PNEUMOC VAC/ADMIN/RCVD: CPT | Performed by: INTERNAL MEDICINE

## 2022-02-17 PROCEDURE — 3017F COLORECTAL CA SCREEN DOC REV: CPT | Performed by: INTERNAL MEDICINE

## 2022-02-17 RX ORDER — AMLODIPINE BESYLATE 10 MG/1
10 TABLET ORAL DAILY
Qty: 90 TABLET | Refills: 1 | Status: SHIPPED | OUTPATIENT
Start: 2022-02-17 | End: 2022-05-19

## 2022-02-17 RX ORDER — BUDESONIDE, GLYCOPYRROLATE, AND FORMOTEROL FUMARATE 160; 9; 4.8 UG/1; UG/1; UG/1
2 AEROSOL, METERED RESPIRATORY (INHALATION) 2 TIMES DAILY
Qty: 1 EACH | Refills: 1 | Status: SHIPPED | OUTPATIENT
Start: 2022-02-17 | End: 2022-03-23 | Stop reason: SDUPTHER

## 2022-02-17 ASSESSMENT — LIFESTYLE VARIABLES: HOW OFTEN DO YOU HAVE A DRINK CONTAINING ALCOHOL: NEVER

## 2022-02-17 NOTE — PROGRESS NOTES
Medicare Annual Wellness Visit    Hellen Castillo is here for Medicare Rodney Antonio 2 was seen today for medicare awv. Diagnoses and all orders for this visit:    Medicare annual wellness visit, subsequent    Essential hypertension, benign  -    Increase to amLODIPine (NORVASC) 10 MG tablet; Take 1 tablet by mouth daily    Pulmonary emphysema, unspecified emphysema type ([address]  -   Change symbicort to:  Budeson-Glycopyrrol-Formoterol (BREZTRI AEROSPHERE) 160-9-4.8 MCG/ACT AERO; Inhale 2 puffs into the lungs in the morning and at bedtime    Stage 3 chronic kidney disease, unspecified whether stage 3a or 3b CKD ([address]         Recommendations for Preventive Services Due: see orders and patient instructions/AVS.  Recommended screening schedule for the next 5-10 years is provided to the patient in written form: see Patient Instructions/AVS.     Return in 3 months (on 5/17/2022) for Medicare Annual Wellness Visit in 1 year. Reviewed and updated this visit by clinical staff: Allergies  Meds       Subjective      Patient states he has a friend who has been using Breztri inhaler and has had very good results. He wants to know if I can prescribe this for him. Patient states overall he feels his health is stable. He continues to use home oxygen. On exam, he is in no acute distress. His heart sounds are reduced. There are scattered wheezes. Calves are without edema. Patient's complete Health Risk Assessment and screening values have been reviewed and are found in Flowsheets. The following problems were reviewed today and where indicated follow up appointments were made and/or referrals ordered.     Positive Risk Factor Screenings with Interventions:               General Health and ACP:  General  In general, how would you say your health is?: Very Good  In the past 7 days, have you experienced any of the following: New or Increased Pain, New or Increased Fatigue, Loneliness, Social Isolation, Stress or Anger?: No  Do you get the social and emotional support that you need?: Yes  Do you have a Living Will?: (!) No    Advance Directives     Power of [address] Will ACP-Advance Directive ACP-Power of     Not on File Not on File Not on File Not on File      General Health Risk Interventions:  · Living Will information provided and discussed     Hearing/Vision:  No exam data present  Hearing/Vision  Do you or your family notice any trouble with your hearing that hasn't been managed with hearing aids?: No  Do you have difficulty driving, watching TV, or doing any of your daily activities because of your eyesight?: No  Have you had an eye exam within the past year?: (!) No    Hearing/Vision Interventions:  · He will see his eye doctor         Objective           Allergies   Allergen Reactions    Codeine Hives     Prior to Visit Medications    Medication Sig Taking?  Authorizing Provider   amLODIPine (NORVASC) 10 MG tablet Take 1 tablet by mouth daily Yes Benedicto Diaz MD   Budeson-Glycopyrrol-Formoterol (BREZTRI AEROSPHERE) 160-9-4.8 MCG/ACT AERO Inhale 2 puffs into the lungs in the morning and at bedtime Yes June Ortiz MD   VENTOLIN  (90 Base) MCG/ACT inhaler INHALE 2 PUFFS INTO THE LUNGS EVERY 6 HOURS AS NEEDED FOR WHEEZING Yes June Ortiz MD   tiotropium (SPIRIVA HANDIHALER) 18 MCG inhalation capsule Inhale 1 capsule into the lungs daily Yes Benedicto Diaz MD   fluticasone (FLONASE) 50 MCG/ACT nasal spray 2 sprays by Nasal route daily Yes Benedicto Diaz MD   atorvastatin (LIPITOR) 40 MG tablet TAKE 1 TABLET BY MOUTH EVERY NIGHT Yes MD Jordyn Nicolas MISC by Does not apply route Duration: 5 years Yes Annabella Wong MD   Spacer/Aero-Holding Chambers LILIANE 1 Device by Does not apply route daily Yes Benedicto Diaz MD   levalbuterol (XOPENEX) 0.63 MG/3ML nebulization Take 3 mLs by nebulization every 4 hours as needed for Wheezing or Shortness of Breath Yes Jo Lara MD   guaiFENesin (MUCINEX) 600 MG extended release tablet Take 600 mg by mouth daily Yes Historical Provider, MD   Multiple Vitamins-Minerals (THERAPEUTIC MULTIVITAMIN-MINERALS) tablet Take 1 tablet by mouth daily Yes Historical Provider, MD   Respiratory Therapy Supplies (NEBULIZER) LILIANE 1 applicator by Does not apply route 4 times daily as needed (SOB/wheezing)  Patient not taking: Reported on 2/17/2022  Sander Minors,        CareTeam (Including outside providers/suppliers regularly involved in providing care):   Patient Care Team:  Hany Leal MD as PCP - General (Internal Medicine)  Hany Leal MD as PCP - [facility] Empaneled Provider  Jo Lara MD as Consulting Physician (Pulmonology)

## 2022-02-17 NOTE — PATIENT INSTRUCTIONS
Personalized Preventive Plan for Liz Isabel - 2/17/2022  Medicare offers a range of preventive health benefits. Some of the tests and screenings are paid in full while other may be subject to a deductible, co-insurance, and/or copay. Some of these benefits include a comprehensive review of your medical history including lifestyle, illnesses that may run in your family, and various assessments and screenings as appropriate. After reviewing your medical record and screening and assessments performed today your provider may have ordered immunizations, labs, imaging, and/or referrals for you. A list of these orders (if applicable) as well as your Preventive Care list are included within your After Visit Summary for your review. Other Preventive Recommendations:    · A preventive eye exam performed by an eye specialist is recommended every 1-2 years to screen for glaucoma; cataracts, macular degeneration, and other eye disorders. · A preventive dental visit is recommended every 6 months. · Try to get at least 150 minutes of exercise per week or 10,000 steps per day on a pedometer . · Order or download the FREE \"Exercise & Physical Activity: Your Everyday Guide\" from [employer]. Call 1-224.915.1149 or search [employer] online. · You need 8254-7416 mg of calcium and 2967-1323 IU of vitamin D per day. It is possible to meet your calcium requirement with diet alone, but a vitamin D supplement is usually necessary to meet this goal.  · When exposed to the sun, use a sunscreen that protects against both UVA and UVB radiation with an SPF of 30 or greater. Reapply every 2 to 3 hours or after sweating, drying off with a towel, or swimming. · Always wear a seat belt when traveling in a car. Always wear a helmet when riding a bicycle or motorcycle.     Eye exam  Living Will documents  Abdomen ultrasound due now  Chest CT due 4/22 Sarahy Bland)    Get labs ordered

## 2022-02-23 DIAGNOSIS — D72.819 LEUKOPENIA, UNSPECIFIED TYPE: Primary | ICD-10-CM

## 2022-02-24 RX ORDER — ATORVASTATIN CALCIUM 40 MG/1
TABLET, FILM COATED ORAL
Qty: 90 TABLET | Refills: 1 | Status: SHIPPED | OUTPATIENT
Start: 2022-02-24

## 2022-03-02 ENCOUNTER — TELEPHONE (OUTPATIENT)
Dept: INTERNAL MEDICINE CLINIC | Age: 72
End: 2022-03-02

## 2022-03-02 DIAGNOSIS — J43.9 PULMONARY EMPHYSEMA, UNSPECIFIED EMPHYSEMA TYPE (HCC): ICD-10-CM

## 2022-03-02 NOTE — TELEPHONE ENCOUNTER
Please call patient at number provided to discuss inhalers needing re-filled as well as Spiriva and Symbicort and Ventrolin. He uses Proacta #[id number]. Not sure exactly which inhalers he needs re-filled.

## 2022-03-02 NOTE — TELEPHONE ENCOUNTER
Spoke with patient to clarify message, he only needs the Ventolin refilled which I have sent to [facility] he has refills of everything else.

## 2022-03-07 ENCOUNTER — TELEPHONE (OUTPATIENT)
Dept: ADMINISTRATIVE | Age: 72
End: 2022-03-07

## 2022-03-07 NOTE — TELEPHONE ENCOUNTER
Submitted PA for Spiriva HandiHaler 18MCG capsules  [facility]ia [facility]: [address] Medication has been approved through 12/31/2022. If this requires a response please respond to the pool. [address])    Please advise patient. Thank you.

## 2022-03-23 ENCOUNTER — TELEPHONE (OUTPATIENT)
Dept: INTERNAL MEDICINE CLINIC | Age: 72
End: 2022-03-23

## 2022-03-23 DIAGNOSIS — J43.9 PULMONARY EMPHYSEMA, UNSPECIFIED EMPHYSEMA TYPE (HCC): ICD-10-CM

## 2022-03-23 RX ORDER — BUDESONIDE, GLYCOPYRROLATE, AND FORMOTEROL FUMARATE 160; 9; 4.8 UG/1; UG/1; UG/1
2 AEROSOL, METERED RESPIRATORY (INHALATION) 2 TIMES DAILY
Qty: 1 EACH | Refills: 1 | Status: SHIPPED | OUTPATIENT
Start: 2022-03-23 | End: 2022-05-19 | Stop reason: SDUPTHER

## 2022-03-23 NOTE — TELEPHONE ENCOUNTER
The pt verbalized understanding       The pt state that he can afford the medication due to it being $47 dollars

## 2022-03-23 NOTE — TELEPHONE ENCOUNTER
Patient called stating that he is using the following medication:      Budeson-Glycopyrrol-Formoterol (BREZTRI AEROSPHERE) 160-9-4.8 MCG/ACT AERO Inhale 2 puffs into the lungs in the morning and at bedtime       He states he is satisfied with this and would like to stay on this. In opposed to the Symbicort. He has been taking both but he states he prefers this one if he had to choose.

## 2022-03-24 ENCOUNTER — TELEPHONE (OUTPATIENT)
Dept: ADMINISTRATIVE | Age: 72
End: 2022-03-24

## 2022-03-25 NOTE — TELEPHONE ENCOUNTER
Let patient know his insurance company denied the Daylene Duhamel. Based on last week's phone encounter, he was able to find it for himself for $47.    If he needs an alternate inhaler, let me know.

## 2022-03-25 NOTE — TELEPHONE ENCOUNTER
The pt stated that he was able to  the medication     But after speaking to the pharmacy they confirmed that on 2/7/2022 he was able to get the medication for 47 dollars     They did inform the staff that the pt will be able the next refill ( which will be ready on Monday) for the same price       Pt and MD notified

## 2022-03-25 NOTE — TELEPHONE ENCOUNTER
Denial letter for [facility] attached. Please notify patient. Thank you.

## 2022-03-29 NOTE — TELEPHONE ENCOUNTER
Patient Called stating that he went to  the medication and he wanted to thank Bradley Lau for being so kind and courteous he was very appreciative.  He would like a call back at the following number:  [phone]

## 2022-04-05 ENCOUNTER — OFFICE VISIT (OUTPATIENT)
Dept: PULMONOLOGY | Age: 72
End: 2022-04-05
Payer: MEDICARE

## 2022-04-05 VITALS
OXYGEN SATURATION: 99 % | BODY MASS INDEX: 23.13 KG/M2 | HEIGHT: 67 IN | WEIGHT: 147.4 LBS | SYSTOLIC BLOOD PRESSURE: 136 MMHG | TEMPERATURE: 96.4 F | DIASTOLIC BLOOD PRESSURE: 82 MMHG | HEART RATE: 74 BPM

## 2022-04-05 DIAGNOSIS — Z87.891 HISTORY OF TOBACCO USE: ICD-10-CM

## 2022-04-05 DIAGNOSIS — J44.9 COPD, VERY SEVERE (HCC): Primary | ICD-10-CM

## 2022-04-05 DIAGNOSIS — J96.11 CHRONIC RESPIRATORY FAILURE WITH HYPOXIA (HCC): ICD-10-CM

## 2022-04-05 DIAGNOSIS — R06.09 DOE (DYSPNEA ON EXERTION): ICD-10-CM

## 2022-04-05 PROCEDURE — 3023F SPIROM DOC REV: CPT | Performed by: INTERNAL MEDICINE

## 2022-04-05 PROCEDURE — 3017F COLORECTAL CA SCREEN DOC REV: CPT | Performed by: INTERNAL MEDICINE

## 2022-04-05 PROCEDURE — G8420 CALC BMI NORM PARAMETERS: HCPCS | Performed by: INTERNAL MEDICINE

## 2022-04-05 PROCEDURE — G8427 DOCREV CUR MEDS BY ELIG CLIN: HCPCS | Performed by: INTERNAL MEDICINE

## 2022-04-05 PROCEDURE — 99214 OFFICE O/P EST MOD 30 MIN: CPT | Performed by: INTERNAL MEDICINE

## 2022-04-05 PROCEDURE — 4040F PNEUMOC VAC/ADMIN/RCVD: CPT | Performed by: INTERNAL MEDICINE

## 2022-04-05 PROCEDURE — 1123F ACP DISCUSS/DSCN MKR DOCD: CPT | Performed by: INTERNAL MEDICINE

## 2022-04-05 PROCEDURE — 1036F TOBACCO NON-USER: CPT | Performed by: INTERNAL MEDICINE

## 2022-04-05 NOTE — PROGRESS NOTES
[facility]    Outpatient Follow Up Note    Subjective:   CHIEF COMPLAINT / HPI:     The patient is 66 y.o. male who is here for follow-up of very severe COPD complicated with chronic hypoxemic respiratory failure. Last visit I referred him to pulmonary rehab. It sounds like initially went but then decided it was not for him. He does use an exercise bike and rides it for about 3 minutes. His chronic dyspnea on exertion is unchanged and does affect his quality of life. He has no cough, wheezing, chest tightness, hemoptysis, sputum production, or peripheral edema. Since last visit he has been trialed on Breztri and states he has less dyspnea on exertion with that. However he is still taking Spiriva every few days as he thought Breztri only replaced Symbicort    8/19/2021  Sussy Lynn is here for follow-up of very severe COPD complicated with chronic hypoxemic respiratory failure. He states his breathing is worse now than when I saw him in March. He gets short of breath with just walking around his residence and doing basic things like showering. He is taking his Symbicort, Spiriva, and uses oxygen continuously. He uses albuterol multiple times a day. He has never done pulmonary rehab. Currently he has more cough and wheezing. No chest pain orthopnea or peripheral edema. No anorexia, weight loss, or hemoptysis    3/2/2021  Sussy Lynn asked for a video visit  for follow-up of very severe COPD complicated with chronic hypoxemic respiratory failure. He complains of dyspnea on exertion with his routine activities of daily living. This however is not worse when compared to his chronic baseline but still is quite troublesome. Albuterol does help but wears off in several hours. He is taking Symbicort, Spiriva, and does wear oxygen when outside the house but not always when he is ambulating in his house due to the weight of his portable oxygen concentrator.   He has no associated fevers, chills, cough, chest pain, peripheral edema, or wheezing    11/10/2020  Kelli Isabel is here for follow-up of very severe COPD complicated with chronic hypoxemic respiratory failure. Since last visit in February he has done reasonably well given his degree of COPD. He continues on Symbicort and Spiriva but is asking for Spiriva HandiHaler instead of Respimat as he states it is easier to use. He was having tachycardia and tremors with albuterol nebulizer so that was changed to Xopenex but did not help with those symptoms and thus rarely uses it. His chronic dyspnea on exertion seems to be at baseline and he denies any significant cough, wheezing, chest tightness. He does mention that he has clear nasal rhinorrhea that is a nuisance    2/17/2020  Kelli Isabel is here for follow-up of very severe COPD. After last visit I ordered PFTs, ABG, CT chest, and echo. During the test he was noted to be short of breath and was sent to the ER. He was admitted for 23-hour observation for treatment of a COPD exacerbation. Today he states that he is doing quite a bit better. He was discharged on home oxygen and was given Xopenex to use in place of albuterol due to tachycardia and tremors. He is using oxygen in his house and states that helps quite a bit. He has not been using oxygen when he leaves the house and states that he does not think he really needs it. He is compliant with his MDIs. He has not started pulmonary rehab yet but states that he will    Initial visit 1/20/2020  Kelli Isabel presents today for a new patient visit for evaluation of dyspnea on exertion that has progressively worsened over the last 7 months. [facility] has a history of COPD dating back to 2013. He had PFTs at that time that showed a very severe obstructive defect. He is only been to the emergency once for his COPD and that was in 2017. He did not need to be admitted. He is not on home oxygen and has not participated in pulmonary rehab.   His regimen includes Symbicort, Spiriva, and albuterol, which she does use daily. He had a CT chest in March 2018 that showed severe apical predominant emphysema. Alpha-1 antitrypsin done in December was 164. He had a stress echo back in 2013 that showed a reversible ischemic defect with elevated pulmonary pressures. He had a left heart catheterization after that that showed no CAD. Patient likes to ride a stationary bicycle but unfortunately can only do that for about 2 minutes at present due to dyspnea. He also gets short of breath and a hot shower and with steps. He will get short of breath if he walks too much around his house room to room. He has a mild cough with occasional clear phlegm but no hemoptysis. Occasionally has some chest tightness and wheezing. No fevers chills anorexia or weight loss. No peripheral edema or orthopnea. Past Medical History:    Past Medical History:   Diagnosis Date    Anemia     Asthma     Chronic kidney disease     COPD (chronic obstructive pulmonary disease) ([facility])     severe, pft 2/20    Essential hypertension     Hyperlipidemia        Social History:    Patient is a retired . He smoked half a pack of cigarettes a day for 45 years and quit in 2015.     Family History:  Asthma  TB    Current Medications:  Current Outpatient Medications on File Prior to Visit   Medication Sig Dispense Refill    Budeson-Glycopyrrol-Formoterol (BREZTRI AEROSPHERE) 160-9-4.8 MCG/ACT AERO Inhale 2 puffs into the lungs in the morning and at bedtime 1 each 1    VENTOLIN  (90 Base) MCG/ACT inhaler INHALE 2 PUFFS INTO THE LUNGS EVERY 6 HOURS AS NEEDED FOR WHEEZING 54 g 1    atorvastatin (LIPITOR) 40 MG tablet TAKE 1 TABLET BY MOUTH EVERY NIGHT 90 tablet 1    amLODIPine (NORVASC) 10 MG tablet Take 1 tablet by mouth daily 90 tablet 1    fluticasone (FLONASE) 50 MCG/ACT nasal spray 2 sprays by Nasal route daily 1 Bottle 11    Handicap Placard MISC by Does not apply route Duration: 5 years 1 each 0    Spacer/Aero-Holding Chambers LILIANE 1 Device by Does not apply route daily 1 Device 0    guaiFENesin (MUCINEX) 600 MG extended release tablet Take 600 mg by mouth daily      Respiratory Therapy Supplies (NEBULIZER) LILIANE 1 applicator by Does not apply route 4 times daily as needed (SOB/wheezing) 1 Device 0    Multiple Vitamins-Minerals (THERAPEUTIC MULTIVITAMIN-MINERALS) tablet Take 1 tablet by mouth daily      levalbuterol (XOPENEX) 0.63 MG/3ML nebulization Take 3 mLs by nebulization every 4 hours as needed for Wheezing or Shortness of Breath (Patient not taking: Reported on 4/5/2022) 3 vial 3     No current facility-administered medications on file prior to visit. REVIEW OF SYSTEMS:    CONSTITUTIONAL: Negative for fevers and chills  HEENT: Negative for oropharyngeal exudate, post nasal drip, sinus pain / pressure, nasal congestion, ear pain  RESPIRATORY:  See HPI  CARDIOVASCULAR: Negative for chest pain, palpitations, edema  GASTROINTESTINAL: Negative for nausea, vomiting, diarrhea, constipation and abdominal pain  GENITOURINARY: Negative for dysuria, urinary frequency, urinary hesitancy  HEMATOLOGICAL: Negative for adenopathy  SKIN: Negative for clubbing, cyanosis, skin lesions  ENDOCRINE: Negative for polyuria, polydipsia, heat intolerance, cold intolerance   EXTREMITIES: Negative for weakness or decreased ROM in all extremities  NEUROLOGICAL: Negative for unilateral weakness, speech or gait abnormalities    Objective:   PHYSICAL EXAM:        VITALS:  /82 (Site: Left Upper Arm, Position: Sitting, Cuff Size: Small Adult)   Pulse 74   Temp 96.4 °F (35.8 °C) (Infrared)   Ht 5' 7\" (1.702 m)   Wt 147 lb 6.4 oz (66.9 kg)   SpO2 99% Comment: 4L  BMI 23.09 kg/m²     Gen: Well developed, well nourished man in no acute distress.  Speaking in full sentences with out using accessory resp muscles  Eyes: PERRL, EOMI, normal conjunctivae  Ears, Nose, Mouth and Throat: Hearing is normal. Oropharynx is normal  Neck: No lymphadenopathy  Respiratory: Clear to auscultation  CV: RRR without M/R/R  Abd: +BS, soft, NT/ND  Musculoskeletal: No cyanosis, clubbing, or edema. Skin: No rashes, ulcers, or subcutaneous nodules  Psychiatric: Alert and oriented to time place and person      DATA:      A1AT 164    Radiology Review:  Pertinent images / reports were reviewed as a part of this visit. CT Chest 4/9/2021  FINDINGS:       AIRWAYS: The central airways are patent.       LUNGS: Stable severe emphysema. Stable benign calcified pulmonary nodules in the left lower lobe. No indeterminate pulmonary nodule.       PLEURA: Normal.       HEART / GREAT VESSELS: Heart, aorta and great vessels are normal size.        LYMPH NODES: Large, densely calcified subcarinal lymph node is stable. No lymphadenopathy.       CHEST WALL: Normal.       BONES: No destructive osseous process.       UPPER ABDOMEN: No acute abnormality           Impression       Category 1, negative. No nodules and/or definitely benign nodules.  Continue annual screening in 12 months.         CT Chest 2/10/2020  FINDINGS:       Lungs and Pleura: Severe emphysema with panlobular changes in the upper lobes. Centrilobular changes in the lower lobes. No pleural effusion. No significant consolidation. Calcified granuloma in the left lower lung.       Mediastinum: No paratracheal lymphadenopathy. Large calcified subcarinal lymph node compatible with incidental calcified granuloma. Main pulmonary artery is upper limits of normal measuring 3 cm. No hilar lymphadenopathy identified. No pericardial    effusion. Normal heart size.       Lower neck and chest wall: No axillary lymphadenopathy. No supraclavicular lymphadenopathy.       Upper Abdomen: No discrete liver abnormality. Spleen is unremarkable. No hydronephrosis visualized.       Bones: Mild degenerative disease. No acute osseous abnormality.           Impression       1.  Severe emphysema with panlobular changes in the lung apices. 2. No acute cardiopulmonary abnormality.         CT Chest [facility] March 6, 2018 reveals the following:  IMPRESSION:    1. Severe emphysema. 2. No evidence of any acute pulmonary disease. 3. No evidence of any pulmonary neoplasm. PFTs:  Pulmonary Function Test: [facility] 10. 2020     Indication:        Shortness of breath                          Wheezing                          Cough                          Smoked cigarettes for 41 years     Test comment:                           Patient stated that he was short of breath during testing. He was unable to complete post PFT due to shortness of breath                          He was transported to the ER                          Pulse ox is 92 % on room air                          Estimated body mass index is 21.93 kg/m² as calculated from the following:    Height as of an earlier encounter on 2/10/20: 5' 7\" (1.702 m). Weight as of an earlier encounter on 2/10/20: 140 lb (63.5 kg).      Spirometry data:                          FEV1/FVC: 31. Predicted ratio 76                          FEV1 0.46 L, which is 18 % predicted                           FVC is 1.48 L, which is 43 % predicted     Lung Volumes:                          TLC (by Plethysmography) is 9.18 L, which is 143 % predicted                          RV is 7.97 L which is 348 % predicted     Diffusion Capacity:                          DLCO is 7.07 which is 24 % predicted     Impression:                          1. There is very severe obstruction present                          2. There is  significant hyperinflation / air trapping                          3. There is severe reduction in diffusion capacity     Comment:   Test findings are likely not accurate due to poor effort and shortness of breath he had during testing  Consider repeating PFT when he reaches a baseline       Date: 10/9/2013 10:23:35 IJ                     SPIROMETRY                  Units    Pred  PreDrug  %Pred  PostDrug  %Pred  %Chg  FVC         L,btps       3.36     1.96     66      4.24     60    57  FEV1        L,btps       2.57     0.66     47      9.26     18    16  FEV1/FVC    %           78.       77.       43     35.       45     5  FEV3        L,btps       3.26     1. 14     91      1.93     48    43  VTE87-89%   L/s          2.37     0.20      8      0.31     84    46  FEFmax      L/s          7.29     1.52     37      2.27     21    -1  FIFmax      L/s                   2.49             4.53          -49  TET         U                    39.97            75.63           -9    PLETHYSMOGRAPHY                     Units    Pred  PreDrug  %Pred   TLC            L,btps       5.52    12.01    217   FRC            L,btps       3.42    10.58    309   RV             L,btps       2.17     2.15    788   ERV            L,btps       1.25     0.84     67   RV/TLC         %           39.      81.      207   VTG            L,btps               10.57          VC             L,btps       3.36     2.27     68   RAW            cmH2O/L/s    0.20    11.41            ECHO 2/10/2020   Summary   Left ventricular cavity size is normal with mild concentric left ventricular   hypertrophy. Left ventricular function is normal with ejection fraction estimated at   50-55%. Indeterminate diastolic function. Slight thickening of leaflets of mitral valve. Mild mitral regurgitation   Aortic valve appears thickened/calcified but opens adequately. Mild tricuspid regurgitation. Estimated pulmonary artery systolic pressure is at 35 mmHg assuming a right   atrial pressure of 3 mmHg. Stress echo October 25, 2013 [facility]  FINAL INTERPRETATION  Abnormal study consistent with a small area of ischemia. Overall study quality is excellent. Scan significance indicates low to moderate cardiac risk. There is right ventricular dilation.   There is septal flattening at stress consistent with elevated   right heart pressures. Left Heart Catheterization (11/25/13)    \"ANGIOGRAPHIC FINDINGS:  1. Left main coronary artery was free of any angiographically significant disease. It gave of the left anterior descending artery and the left circumflex artery. 2. The left anterior descending had a normal anatomic course. It gave off multiple diagonals. It was free of any angiographically significant disease. 3. The left circumflex artery gives off a large obtuse marginal and it was free of any angiographically significant disease. 4. The right coronary artery gave rise to posterior descending artery and posterolateral branches distally, making it a co-dominant vessel. It was free of any angiographically significant disease. HEMODYNAMICS:   LVEDP: 22   No gradient across the aortic valve   CONCLUSION:   No evidence of coronary artery disease. \"    Assessment:      Diagnosis Orders   1. COPD, very severe ([address]  CT CHEST HIGH RESOLUTION   2. SEGAL (dyspnea on exertion)  CT CHEST HIGH RESOLUTION   3. History of tobacco use     4. Chronic respiratory failure with hypoxia (HCC)         Plan:   1. Continue Breztri, home oxygen at 2 L and as needed albuterol/Xopenex. Discontinue Spiriva as it is located in the respiratory  2. Obtain CT chest for lung cancer screening as well as high resolution Strat X protocol for possible [place] endobronchial valve  3. Patient quit smoking in 2015. He is up-to-date with Prevnar 13, Pneumovax. He is fully vaccinated for COVID-19  4. Continue Flonase for nasal rhinorrhea  5. Patient prefers exercising on bike at home in lieu of pulmonary rehab  6. I talked to him about the [place] endobronchial valve again. He has literature on it from his last visit to reference should he like. We talked about the procedure and its risks and benefits.   If patient is interested in is a candidate by Strat X then he will need further evaluation including ABG as he has a history of hypercapnia  7.  Return for reevaluation in 3-month or sooner if needed

## 2022-05-02 DIAGNOSIS — R05.9 COUGH: ICD-10-CM

## 2022-05-02 RX ORDER — FLUTICASONE PROPIONATE 50 MCG
SPRAY, SUSPENSION (ML) NASAL
Qty: 16 G | Refills: 1 | Status: SHIPPED | OUTPATIENT
Start: 2022-05-02 | End: 2022-05-04 | Stop reason: SDUPTHER

## 2022-05-04 DIAGNOSIS — R05.9 COUGH: ICD-10-CM

## 2022-05-04 RX ORDER — FLUTICASONE PROPIONATE 50 MCG
SPRAY, SUSPENSION (ML) NASAL
Qty: 3 EACH | Refills: 1 | Status: SHIPPED | OUTPATIENT
Start: 2022-05-04 | End: 2022-10-21 | Stop reason: SDUPTHER

## 2022-05-10 ENCOUNTER — HOSPITAL ENCOUNTER (OUTPATIENT)
Dept: CT IMAGING | Age: 72
Discharge: HOME | End: 2022-05-10
Payer: MEDICARE

## 2022-05-10 DIAGNOSIS — R06.09 DOE (DYSPNEA ON EXERTION): ICD-10-CM

## 2022-05-10 DIAGNOSIS — J44.9 COPD, VERY SEVERE (HCC): ICD-10-CM

## 2022-05-10 PROCEDURE — 71250 CT THORAX DX C-: CPT

## 2022-05-12 ENCOUNTER — TELEPHONE (OUTPATIENT)
Dept: PULMONOLOGY | Age: 72
End: 2022-05-12

## 2022-05-12 NOTE — TELEPHONE ENCOUNTER
Fax sent to [facility] at [facility] radiology dept asking for a downloaded disc for HRCT

## 2022-05-19 ENCOUNTER — OFFICE VISIT (OUTPATIENT)
Dept: INTERNAL MEDICINE CLINIC | Age: 72
End: 2022-05-19
Payer: MEDICARE

## 2022-05-19 VITALS
WEIGHT: 136.2 LBS | HEART RATE: 82 BPM | BODY MASS INDEX: 21.33 KG/M2 | DIASTOLIC BLOOD PRESSURE: 80 MMHG | OXYGEN SATURATION: 94 % | SYSTOLIC BLOOD PRESSURE: 132 MMHG

## 2022-05-19 DIAGNOSIS — I10 ESSENTIAL HYPERTENSION, BENIGN: ICD-10-CM

## 2022-05-19 DIAGNOSIS — J43.9 PULMONARY EMPHYSEMA, UNSPECIFIED EMPHYSEMA TYPE (HCC): Primary | ICD-10-CM

## 2022-05-19 DIAGNOSIS — E78.5 HYPERLIPIDEMIA, UNSPECIFIED HYPERLIPIDEMIA TYPE: ICD-10-CM

## 2022-05-19 DIAGNOSIS — N18.30 STAGE 3 CHRONIC KIDNEY DISEASE, UNSPECIFIED WHETHER STAGE 3A OR 3B CKD (HCC): ICD-10-CM

## 2022-05-19 PROCEDURE — G8420 CALC BMI NORM PARAMETERS: HCPCS | Performed by: INTERNAL MEDICINE

## 2022-05-19 PROCEDURE — 1036F TOBACCO NON-USER: CPT | Performed by: INTERNAL MEDICINE

## 2022-05-19 PROCEDURE — 99214 OFFICE O/P EST MOD 30 MIN: CPT | Performed by: INTERNAL MEDICINE

## 2022-05-19 PROCEDURE — 1123F ACP DISCUSS/DSCN MKR DOCD: CPT | Performed by: INTERNAL MEDICINE

## 2022-05-19 PROCEDURE — 3017F COLORECTAL CA SCREEN DOC REV: CPT | Performed by: INTERNAL MEDICINE

## 2022-05-19 PROCEDURE — 3023F SPIROM DOC REV: CPT | Performed by: INTERNAL MEDICINE

## 2022-05-19 PROCEDURE — G8427 DOCREV CUR MEDS BY ELIG CLIN: HCPCS | Performed by: INTERNAL MEDICINE

## 2022-05-19 RX ORDER — BUDESONIDE, GLYCOPYRROLATE, AND FORMOTEROL FUMARATE 160; 9; 4.8 UG/1; UG/1; UG/1
2 AEROSOL, METERED RESPIRATORY (INHALATION) 2 TIMES DAILY
Qty: 1 EACH | Refills: 5 | Status: SHIPPED | OUTPATIENT
Start: 2022-05-19 | End: 2022-10-03 | Stop reason: SDUPTHER

## 2022-05-19 NOTE — PATIENT INSTRUCTIONS
Get your 2d covid booster  -  Shingrix advised at pharmacy    Try to increase caloric intake    See dr Nina Simmonds about low wbc count and weight loss

## 2022-05-19 NOTE — PROGRESS NOTES
Gilmer Flores (:  1950) is a 66 y.o. male, here for evaluation of the following chief complaint(s):    Follow-up (breathing )      ASSESSMENT/PLAN:  1. Pulmonary emphysema, unspecified emphysema type ([address]  -     RF Budeson-Glycopyrrol-Formoterol (BREZTRI AEROSPHERE) 160-9-4.8 MCG/ACT AERO; Inhale 2 puffs into the lungs in the morning and at bedtime, Disp-1 each, R-5Normal  -     VENTOLIN  (90 Base) MCG/ACT inhaler; INHALE 2 PUFFS INTO THE LUNGS EVERY 6 HOURS AS NEEDED FOR WHEEZING, Disp-54 g, R-5, DAWNormal  Likely weight loss related to severe emphysema. Has FU with Pulmonary  2. Stage 3 chronic kidney disease, unspecified whether stage 3a or 3b CKD (HCC)  Stable  -  See Dr. Shanthi Burden about leukopenia, weight loss    Essential hypertension, benign  -    Controlled on amLODIPine (NORVASC) 10 MG tablet; Take 1 tablet by mouth daily     Hyperlipidemia  Encouraged compliance with Lipitor     Return in about 6 months (around 2022). SUBJECTIVE/OBJECTIVE:  HPI     Patient is here for routine visit. He states he feels in his usual state of health. He becomes more easily short of breath when it is warmer outside or when he is up moving around. [facility] works well for him. He states he feels like the inhalers reduce his appetite. Tries to drink protein drinks. He does not always remember to take his atorvastatin. Review of Systems   Constitutional: Positive for unexpected weight change. Respiratory: Positive for shortness of breath and wheezing. Cardiovascular: Negative for chest pain.        Past Medical History:   Diagnosis Date    Anemia     Asthma     Chronic kidney disease     COPD (chronic obstructive pulmonary disease) (HCC)     severe, pft     Essential hypertension     Hyperlipidemia        Current Outpatient Medications   Medication Sig Dispense Refill    Budeson-Glycopyrrol-Formoterol (BREZTRI AEROSPHERE) 160-9-4.8 MCG/ACT AERO Inhale 2 puffs into the lungs in the text may contain inaccuracies due to incorrect word recognition. If further clarification is needed please contact the office at [phone]          An electronic signature was used to authenticate this note.     --Tammie Mariscal MD

## 2022-05-22 ASSESSMENT — ENCOUNTER SYMPTOMS
WHEEZING: 1
SHORTNESS OF BREATH: 1

## 2022-06-03 ENCOUNTER — TELEPHONE (OUTPATIENT)
Dept: ADMINISTRATIVE | Age: 72
End: 2022-06-03

## 2022-06-03 NOTE — TELEPHONE ENCOUNTER
Submitted PA for [facility]   Via CMM  Key: [id number] STATUS: APPROVED. LETTER ATTACHED. If this requires a response please respond to the pool. [address]). Please advise patient thank you.

## 2022-10-01 DIAGNOSIS — J43.9 PULMONARY EMPHYSEMA, UNSPECIFIED EMPHYSEMA TYPE (HCC): ICD-10-CM

## 2022-10-03 DIAGNOSIS — J43.9 PULMONARY EMPHYSEMA, UNSPECIFIED EMPHYSEMA TYPE (HCC): ICD-10-CM

## 2022-10-03 RX ORDER — BUDESONIDE, GLYCOPYRROLATE, AND FORMOTEROL FUMARATE 160; 9; 4.8 UG/1; UG/1; UG/1
2 AEROSOL, METERED RESPIRATORY (INHALATION) 2 TIMES DAILY
Qty: 1 EACH | Refills: 5 | Status: SHIPPED | OUTPATIENT
Start: 2022-10-03

## 2022-10-03 RX ORDER — TIOTROPIUM BROMIDE 18 UG/1
CAPSULE ORAL; RESPIRATORY (INHALATION)
Qty: 30 CAPSULE | Refills: 11 | OUTPATIENT
Start: 2022-10-03

## 2022-10-03 NOTE — TELEPHONE ENCOUNTER
Patient is requesting a refill on the following prescriptions:    tiotropium (Nubia Yana) 18 MCG inhalation capsule (Discontinued)  Last appointment: 5/19/2022  Next appointment: 11/21/2022  Last refill: 08/26/2021      Budeson-Glycopyrrol-Formoterol (BREZTRI AEROSPHERE) 160-9-4.8 MCG/ACT AERO  Last appointment: 5/19/2022  Next appointment: 11/21/2022  Last refill: 05/19/22      Please call in to:   [facility] [id number] [facility], [address] [place], [place] [id number] - [address] [place] [address], [address] [place] [id number]   Phone:  [phone]  Fax:  [phone]    Please contact patient to further advise.

## 2022-10-21 ENCOUNTER — OFFICE VISIT (OUTPATIENT)
Dept: PULMONOLOGY | Age: 72
End: 2022-10-21
Payer: MEDICARE

## 2022-10-21 VITALS
HEIGHT: 67 IN | DIASTOLIC BLOOD PRESSURE: 106 MMHG | WEIGHT: 138.4 LBS | HEART RATE: 82 BPM | OXYGEN SATURATION: 98 % | SYSTOLIC BLOOD PRESSURE: 150 MMHG | BODY MASS INDEX: 21.72 KG/M2

## 2022-10-21 DIAGNOSIS — J44.9 COPD, VERY SEVERE (HCC): Primary | ICD-10-CM

## 2022-10-21 DIAGNOSIS — R06.09 DOE (DYSPNEA ON EXERTION): ICD-10-CM

## 2022-10-21 DIAGNOSIS — J96.11 CHRONIC RESPIRATORY FAILURE WITH HYPOXIA (HCC): ICD-10-CM

## 2022-10-21 DIAGNOSIS — Z87.891 HISTORY OF TOBACCO USE: ICD-10-CM

## 2022-10-21 PROCEDURE — G8484 FLU IMMUNIZE NO ADMIN: HCPCS | Performed by: INTERNAL MEDICINE

## 2022-10-21 PROCEDURE — 3017F COLORECTAL CA SCREEN DOC REV: CPT | Performed by: INTERNAL MEDICINE

## 2022-10-21 PROCEDURE — 1036F TOBACCO NON-USER: CPT | Performed by: INTERNAL MEDICINE

## 2022-10-21 PROCEDURE — 99214 OFFICE O/P EST MOD 30 MIN: CPT | Performed by: INTERNAL MEDICINE

## 2022-10-21 PROCEDURE — 1123F ACP DISCUSS/DSCN MKR DOCD: CPT | Performed by: INTERNAL MEDICINE

## 2022-10-21 PROCEDURE — 3023F SPIROM DOC REV: CPT | Performed by: INTERNAL MEDICINE

## 2022-10-21 PROCEDURE — G8420 CALC BMI NORM PARAMETERS: HCPCS | Performed by: INTERNAL MEDICINE

## 2022-10-21 PROCEDURE — G8427 DOCREV CUR MEDS BY ELIG CLIN: HCPCS | Performed by: INTERNAL MEDICINE

## 2022-10-21 RX ORDER — FLUTICASONE PROPIONATE 50 MCG
SPRAY, SUSPENSION (ML) NASAL
Qty: 3 EACH | Refills: 3 | Status: SHIPPED | OUTPATIENT
Start: 2022-10-21

## 2022-10-21 NOTE — PROGRESS NOTES
[facility]    Outpatient Follow Up Note    Subjective:   CHIEF COMPLAINT / HPI:     The patient is 67 y.o. male who is here for follow-up of very severe COPD complicated with chronic hypoxemic respiratory failure. Since last visit I ordered a CT chest both for lung cancer screening and evaluation for [place] endobronchial valves. Right upper lobe does appear to be a viable option for endobronchial valves as fissure integrity between it and right middle lobe and right lower lobe is 100% and voxel density is 71. Justice Hitchcock continues to have significant dyspnea on exertion with everyday activities that is interfering with his quality of life. It is at his baseline. He continues breast tree 2 puffs twice daily and uses his albuterol multiple times a day. He remains on continuous oxygen on 3 L with his POC    4/5/2022  Amaury Morales is here for follow-up of very severe COPD complicated with chronic hypoxemic respiratory failure. Last visit I referred him to pulmonary rehab. It sounds like initially went but then decided it was not for him. He does use an exercise bike and rides it for about 3 minutes. His chronic dyspnea on exertion is unchanged and does affect his quality of life. He has no cough, wheezing, chest tightness, hemoptysis, sputum production, or peripheral edema. Since last visit he has been trialed on Breztri and states he has less dyspnea on exertion with that. However he is still taking Spiriva every few days as he thought Breztri only replaced Symbicort    8/19/2021  Amaury Morales is here for follow-up of very severe COPD complicated with chronic hypoxemic respiratory failure. He states his breathing is worse now than when I saw him in March. He gets short of breath with just walking around his residence and doing basic things like showering. He is taking his Symbicort, Spiriva, and uses oxygen continuously. He uses albuterol multiple times a day.   He has never done pulmonary rehab. Currently he has more cough and wheezing. No chest pain orthopnea or peripheral edema. No anorexia, weight loss, or hemoptysis    3/2/2021  Carmelina Cadena asked for a video visit  for follow-up of very severe COPD complicated with chronic hypoxemic respiratory failure. He complains of dyspnea on exertion with his routine activities of daily living. This however is not worse when compared to his chronic baseline but still is quite troublesome. Albuterol does help but wears off in several hours. He is taking Symbicort, Spiriva, and does wear oxygen when outside the house but not always when he is ambulating in his house due to the weight of his portable oxygen concentrator. He has no associated fevers, chills, cough, chest pain, peripheral edema, or wheezing    11/10/2020  Carmelina Cadena is here for follow-up of very severe COPD complicated with chronic hypoxemic respiratory failure. Since last visit in February he has done reasonably well given his degree of COPD. He continues on Symbicort and Spiriva but is asking for Spiriva HandiHaler instead of Respimat as he states it is easier to use. He was having tachycardia and tremors with albuterol nebulizer so that was changed to Xopenex but did not help with those symptoms and thus rarely uses it. His chronic dyspnea on exertion seems to be at baseline and he denies any significant cough, wheezing, chest tightness. He does mention that he has clear nasal rhinorrhea that is a nuisance    2/17/2020  Carmelina Cadena is here for follow-up of very severe COPD. After last visit I ordered PFTs, ABG, CT chest, and echo. During the test he was noted to be short of breath and was sent to the ER. He was admitted for 23-hour observation for treatment of a COPD exacerbation. Today he states that he is doing quite a bit better. He was discharged on home oxygen and was given Xopenex to use in place of albuterol due to tachycardia and tremors.   He is using oxygen in his house and states that helps quite a bit. He has not been using oxygen when he leaves the house and states that he does not think he really needs it. He is compliant with his MDIs. He has not started pulmonary rehab yet but states that he will    Initial visit 1/20/2020  Tono Ricardo presents today for a new patient visit for evaluation of dyspnea on exertion that has progressively worsened over the last 7 months. Shelly Flower has a history of COPD dating back to 2013. He had PFTs at that time that showed a very severe obstructive defect. He is only been to the emergency once for his COPD and that was in 2017. He did not need to be admitted. He is not on home oxygen and has not participated in pulmonary rehab. His regimen includes Symbicort, Spiriva, and albuterol, which she does use daily. He had a CT chest in March 2018 that showed severe apical predominant emphysema. Alpha-1 antitrypsin done in December was 164. He had a stress echo back in 2013 that showed a reversible ischemic defect with elevated pulmonary pressures. He had a left heart catheterization after that that showed no CAD. Patient likes to ride a stationary bicycle but unfortunately can only do that for about 2 minutes at present due to dyspnea. He also gets short of breath and a hot shower and with steps. He will get short of breath if he walks too much around his house room to room. He has a mild cough with occasional clear phlegm but no hemoptysis. Occasionally has some chest tightness and wheezing. No fevers chills anorexia or weight loss. No peripheral edema or orthopnea. Past Medical History:    Past Medical History:   Diagnosis Date    Anemia     Asthma     Chronic kidney disease     COPD (chronic obstructive pulmonary disease) (HCC)     severe, pft 2/20    Essential hypertension     Hyperlipidemia        Social History:    Patient is a retired .   He smoked half a pack of cigarettes a day for 45 years and quit in 2015.    Family History:  Asthma  TB    Current Medications:  Current Outpatient Medications on File Prior to Visit   Medication Sig Dispense Refill    Budeson-Glycopyrrol-Formoterol (BREZTRI AEROSPHERE) 160-9-4.8 MCG/ACT AERO Inhale 2 puffs into the lungs in the morning and at bedtime 1 each 5    atorvastatin (LIPITOR) 40 MG tablet TAKE 1 TABLET BY MOUTH EVERY NIGHT 90 tablet 1    Handicap Placard MISC by Does not apply route Duration: 5 years 1 each 0    Spacer/Aero-Holding Chambers LILIANE 1 Device by Does not apply route daily 1 Device 0    levalbuterol (XOPENEX) 0.63 MG/3ML nebulization Take 3 mLs by nebulization every 4 hours as needed for Wheezing or Shortness of Breath 3 vial 3    guaiFENesin (MUCINEX) 600 MG extended release tablet Take 600 mg by mouth daily      Respiratory Therapy Supplies (NEBULIZER) LILIANE 1 applicator by Does not apply route 4 times daily as needed (SOB/wheezing) 1 Device 0    Multiple Vitamins-Minerals (THERAPEUTIC MULTIVITAMIN-MINERALS) tablet Take 1 tablet by mouth daily      amLODIPine (NORVASC) 10 MG tablet Take 1 tablet by mouth daily 90 tablet 1     No current facility-administered medications on file prior to visit.      REVIEW OF SYSTEMS:    CONSTITUTIONAL: Negative for fevers and chills  HEENT: Negative for oropharyngeal exudate, post nasal drip, sinus pain / pressure, nasal congestion, ear pain  RESPIRATORY:  See HPI  CARDIOVASCULAR: Negative for chest pain, palpitations, edema  GASTROINTESTINAL: Negative for nausea, vomiting, diarrhea, constipation and abdominal pain  GENITOURINARY: Negative for dysuria, urinary frequency, urinary hesitancy  HEMATOLOGICAL: Negative for adenopathy  SKIN: Negative for clubbing, cyanosis, skin lesions  ENDOCRINE: Negative for polyuria, polydipsia, heat intolerance, cold intolerance   EXTREMITIES: Negative for weakness or decreased ROM in all extremities  NEUROLOGICAL: Negative for unilateral weakness, speech or gait abnormalities    Objective: PHYSICAL EXAM:        VITALS:  BP (!) 150/106 (Site: Left Upper Arm, Position: Sitting, Cuff Size: Small Adult)   Pulse 82   Ht 5' 7\" (1.702 m)   Wt 138 lb 6.4 oz (62.8 kg)   SpO2 98% Comment: 4L  BMI 21.68 kg/m²     Gen: Well developed, well nourished man in no acute distress. Speaking in full sentences with out using accessory resp muscles  Eyes: PERRL, EOMI, normal conjunctivae  Ears, Nose, Mouth and Throat: Hearing is normal. Oropharynx is normal  Neck: No lymphadenopathy  Respiratory: Clear to auscultation  CV: RRR without M/R/R  Abd: +BS, soft, NT/ND  Musculoskeletal: No cyanosis, clubbing, or edema. Skin: No rashes, ulcers, or subcutaneous nodules  Psychiatric: Alert and oriented to time place and person      DATA:      A1AT 164    Radiology Review:  Pertinent images / reports were reviewed as a part of this visit. CT Chest 4/9/2021  FINDINGS:       AIRWAYS: The central airways are patent. LUNGS: Stable severe emphysema. Stable benign calcified pulmonary nodules in the left lower lobe. No indeterminate pulmonary nodule. PLEURA: Normal.       HEART / GREAT VESSELS: Heart, aorta and great vessels are normal size. LYMPH NODES: Large, densely calcified subcarinal lymph node is stable. No lymphadenopathy. CHEST WALL: Normal.       BONES: No destructive osseous process. UPPER ABDOMEN: No acute abnormality           Impression       Category 1, negative. No nodules and/or definitely benign nodules. Continue annual screening in 12 months. CT Chest 2/10/2020  FINDINGS:       Lungs and Pleura: Severe emphysema with panlobular changes in the upper lobes. Centrilobular changes in the lower lobes. No pleural effusion. No significant consolidation. Calcified granuloma in the left lower lung. Mediastinum: No paratracheal lymphadenopathy. Large calcified subcarinal lymph node compatible with incidental calcified granuloma.  Main pulmonary artery is upper limits of normal measuring 3 cm. No hilar lymphadenopathy identified. No pericardial    effusion. Normal heart size. Lower neck and chest wall: No axillary lymphadenopathy. No supraclavicular lymphadenopathy. Upper Abdomen: No discrete liver abnormality. Spleen is unremarkable. No hydronephrosis visualized. Bones: Mild degenerative disease. No acute osseous abnormality. Impression       1. Severe emphysema with panlobular changes in the lung apices. 2. No acute cardiopulmonary abnormality. CT Chest [facility] March 6, 2018 reveals the following:  IMPRESSION:    1. Severe emphysema. 2. No evidence of any acute pulmonary disease. 3. No evidence of any pulmonary neoplasm. PFTs:  Pulmonary Function Test: Fen 10. 2020     Indication:        Shortness of breath                          Wheezing                          Cough                          Smoked cigarettes for 41 years     Test comment:                           Patient stated that he was short of breath during testing. He was unable to complete post PFT due to shortness of breath                          He was transported to the ER                          Pulse ox is 92 % on room air                          Estimated body mass index is 21.93 kg/m² as calculated from the following:    Height as of an earlier encounter on 2/10/20: 5' 7\" (1.702 m). Weight as of an earlier encounter on 2/10/20: 140 lb (63.5 kg).      Spirometry data:                          FEV1/FVC: 31. Predicted ratio 76                          FEV1 0.46 L, which is 18 % predicted                           FVC is 1.48 L, which is 43 % predicted     Lung Volumes:                          TLC (by Plethysmography) is 9.18 L, which is 143 % predicted                          RV is 7.97 L which is 348 % predicted     Diffusion Capacity:                          DLCO is 7.07 which is 24 % predicted     Impression:                          1. There is very severe obstruction present                          2. There is  significant hyperinflation / air trapping                          3. There is severe reduction in diffusion capacity     Comment:   Test findings are likely not accurate due to poor effort and shortness of breath he had during testing  Consider repeating PFT when he reaches a baseline       Date: 10/9/2013 10:23:35 AM                         SPIROMETRY                  Units    Pred  PreDrug  %Pred  PostDrug  %Pred  %Chg  FVC         L,btps       3.36     1.96     58      2.69     80    37  FEV1        L,btps       2.57     0.66     26      0.94     37    44  FEV1/FVC    %           78.      33.       43     35.       45     5  FEV3        L,btps       3.26     1.14     35      1.64     50    44  RWA36-62%   L/s          2.37     0.20      8      0.31     13    58  FEFmax      L/s          7.29     1.52     21      1.51     21    -1  FIFmax      L/s                   2.49             2.24          -10  TET         s                    13.59            12.40           -9    PLETHYSMOGRAPHY                     Units    Pred  PreDrug  %Pred   TLC            L,btps       5.52    12.01    217   FRC            L,btps       3.42    10.58    309   RV             L,btps       2.17     9.74    450   ERV            L,btps       1.25     0.84     67   RV/TLC         %           39.      81.      207   VTG            L,btps               10.57          VC             L,btps       3.36     2.27     68   RAW            cmH2O/L/s    0.20    11.41            ECHO 2/10/2020   Summary   Left ventricular cavity size is normal with mild concentric left ventricular   hypertrophy. Left ventricular function is normal with ejection fraction estimated at   50-55%. Indeterminate diastolic function. Slight thickening of leaflets of mitral valve. Mild mitral regurgitation   Aortic valve appears thickened/calcified but opens adequately.    Mild tricuspid regurgitation. Estimated pulmonary artery systolic pressure is at 35 mmHg assuming a right   atrial pressure of 3 mmHg. Stress echo October 25, 2013 [facility]  FINAL INTERPRETATION  Abnormal study consistent with a small area of ischemia. Overall study quality is excellent. Scan significance indicates low to moderate cardiac risk. There is right ventricular dilation. There is septal flattening at stress consistent with elevated   right heart pressures. Left Heart Catheterization (11/25/13)    \"ANGIOGRAPHIC FINDINGS:  1. Left main coronary artery was free of any angiographically significant disease. It gave of the left anterior descending artery and the left circumflex artery. 2. The left anterior descending had a normal anatomic course. It gave off multiple diagonals. It was free of any angiographically significant disease. 3. The left circumflex artery gives off a large obtuse marginal and it was free of any angiographically significant disease. 4. The right coronary artery gave rise to posterior descending artery and posterolateral branches distally, making it a co-dominant vessel. It was free of any angiographically significant disease. HEMODYNAMICS:   LVEDP: 22   No gradient across the aortic valve   CONCLUSION:   No evidence of coronary artery disease. \"    Assessment:      Diagnosis Orders   1. COPD, very severe ([address]        2. SEGAL (dyspnea on exertion)        3. Chronic respiratory failure with hypoxia (HCC)        4. History of tobacco use              Plan:   1. Continue Breztri, home oxygen at 2 L and as needed albuterol/Xopenex. 2.  I had a lengthy discussion with Merry Gibson about [place] endobronchial valves this visit as I did last visit. I have given him information on it last visit. We talked about how it works and what the procedure involves as well side effects including a pneumothorax and 30% of patients. We talked about the required hospitalization for at least 3 nights.   At this time he would like to give it more consideration. Should he want to proceed he would need repeat PFTs with ABG and 6-minute walk as well as an echo and cardiac stress test  3. Patient quit smoking in 2015. He is up-to-date with Prevnar 13, Pneumovax. He is fully vaccinated for COVID-19. He will be getting his flu vaccine when he sees his PCP in the near future  4. Continue Flonase for nasal rhinorrhea  5. Patient prefers exercising on bike at home in lieu of pulmonary rehab  6.   Return for reevaluation in 3-month or sooner if needed

## 2022-10-24 NOTE — TELEPHONE ENCOUNTER
DR Nancy Daniel PATIENT    Received call from patients PCP office asking if we could call pt regarding his inhaler/spacer. Patient is not receiving the adequate inhalation. I called ans spoke to pt. He said that he needs a new spacer. He can feel air coming out of his when using it. I offered pt to stop by office to  one, he is working at this time and would rather we send rx to [facility].   Please sign pending spacer rx

## 2022-11-22 ENCOUNTER — TELEPHONE (OUTPATIENT)
Dept: INTERNAL MEDICINE CLINIC | Age: 72
End: 2022-11-22

## 2022-11-22 NOTE — TELEPHONE ENCOUNTER
Patient is wondering if we have any samples of Breztri. He states it is too expensive for him to  from the pharmacy and he is almost out. Please contact patient back at number provided if he can come  samples.

## 2022-12-19 DIAGNOSIS — I10 ESSENTIAL HYPERTENSION, BENIGN: ICD-10-CM

## 2022-12-19 RX ORDER — AMLODIPINE BESYLATE 10 MG/1
10 TABLET ORAL DAILY
Qty: 90 TABLET | Refills: 1 | Status: SHIPPED | OUTPATIENT
Start: 2022-12-19 | End: 2023-03-19

## 2022-12-22 ENCOUNTER — TELEPHONE (OUTPATIENT)
Dept: INTERNAL MEDICINE CLINIC | Age: 72
End: 2022-12-22

## 2022-12-22 DIAGNOSIS — J43.9 PULMONARY EMPHYSEMA, UNSPECIFIED EMPHYSEMA TYPE (HCC): ICD-10-CM

## 2022-12-22 RX ORDER — BUDESONIDE, GLYCOPYRROLATE, AND FORMOTEROL FUMARATE 160; 9; 4.8 UG/1; UG/1; UG/1
2 AEROSOL, METERED RESPIRATORY (INHALATION) 2 TIMES DAILY
Qty: 1 EACH | Refills: 2 | Status: SHIPPED | OUTPATIENT
Start: 2022-12-22

## 2022-12-22 RX ORDER — LEVALBUTEROL INHALATION SOLUTION 0.63 MG/3ML
0.63 SOLUTION RESPIRATORY (INHALATION) EVERY 4 HOURS PRN
Qty: 3 EACH | Refills: 2 | Status: SHIPPED | OUTPATIENT
Start: 2022-12-22

## 2022-12-22 NOTE — TELEPHONE ENCOUNTER
Patient's daughter is calling to let Dr. Rodger Brar know patient is very SOB and she will not be able to bring him to his appt today. She states she does not think patient will be able to make it out to the car. Per MA, advised she call the ambulance and have patient taken to the ER. She understood and stated she will call.

## 2022-12-26 DIAGNOSIS — J43.9 PULMONARY EMPHYSEMA, UNSPECIFIED EMPHYSEMA TYPE (HCC): ICD-10-CM

## 2022-12-27 RX ORDER — TIOTROPIUM BROMIDE 18 UG/1
CAPSULE ORAL; RESPIRATORY (INHALATION)
Qty: 30 CAPSULE | Refills: 11 | OUTPATIENT
Start: 2022-12-27

## 2023-03-31 ENCOUNTER — OFFICE VISIT (OUTPATIENT)
Dept: INTERNAL MEDICINE CLINIC | Age: 73
End: 2023-03-31

## 2023-03-31 VITALS
OXYGEN SATURATION: 95 % | HEART RATE: 85 BPM | SYSTOLIC BLOOD PRESSURE: 170 MMHG | DIASTOLIC BLOOD PRESSURE: 114 MMHG | BODY MASS INDEX: 21.55 KG/M2 | WEIGHT: 137.6 LBS

## 2023-03-31 DIAGNOSIS — I10 ESSENTIAL HYPERTENSION, BENIGN: ICD-10-CM

## 2023-03-31 DIAGNOSIS — D64.9 ANEMIA, UNSPECIFIED TYPE: ICD-10-CM

## 2023-03-31 DIAGNOSIS — E78.5 HYPERLIPIDEMIA, UNSPECIFIED HYPERLIPIDEMIA TYPE: ICD-10-CM

## 2023-03-31 DIAGNOSIS — J43.9 PULMONARY EMPHYSEMA, UNSPECIFIED EMPHYSEMA TYPE (HCC): Primary | ICD-10-CM

## 2023-03-31 DIAGNOSIS — N18.30 STAGE 3 CHRONIC KIDNEY DISEASE, UNSPECIFIED WHETHER STAGE 3A OR 3B CKD (HCC): ICD-10-CM

## 2023-03-31 RX ORDER — ALBUTEROL SULFATE 90 UG/1
AEROSOL, METERED RESPIRATORY (INHALATION)
Qty: 2 EACH | Refills: 2 | Status: SHIPPED | OUTPATIENT
Start: 2023-03-31

## 2023-03-31 RX ORDER — PREDNISONE 20 MG/1
20 TABLET ORAL DAILY
Qty: 10 TABLET | Refills: 0 | Status: SHIPPED | OUTPATIENT
Start: 2023-03-31 | End: 2023-04-10

## 2023-03-31 RX ORDER — BUDESONIDE, GLYCOPYRROLATE, AND FORMOTEROL FUMARATE 160; 9; 4.8 UG/1; UG/1; UG/1
2 AEROSOL, METERED RESPIRATORY (INHALATION) 2 TIMES DAILY
Qty: 1 EACH | Refills: 2 | Status: SHIPPED | OUTPATIENT
Start: 2023-03-31

## 2023-03-31 RX ORDER — FLUTICASONE PROPIONATE 50 MCG
SPRAY, SUSPENSION (ML) NASAL
Qty: 3 EACH | Refills: 3 | Status: SHIPPED | OUTPATIENT
Start: 2023-03-31

## 2023-03-31 RX ORDER — FUROSEMIDE 20 MG/1
20 TABLET ORAL DAILY
Qty: 90 TABLET | Refills: 1 | Status: SHIPPED | OUTPATIENT
Start: 2023-03-31 | End: 2023-06-29

## 2023-03-31 RX ORDER — AMLODIPINE BESYLATE 10 MG/1
10 TABLET ORAL DAILY
Qty: 90 TABLET | Refills: 1 | Status: SHIPPED | OUTPATIENT
Start: 2023-03-31 | End: 2023-06-29

## 2023-03-31 SDOH — ECONOMIC STABILITY: FOOD INSECURITY: WITHIN THE PAST 12 MONTHS, YOU WORRIED THAT YOUR FOOD WOULD RUN OUT BEFORE YOU GOT MONEY TO BUY MORE.: NEVER TRUE

## 2023-03-31 SDOH — ECONOMIC STABILITY: INCOME INSECURITY: HOW HARD IS IT FOR YOU TO PAY FOR THE VERY BASICS LIKE FOOD, HOUSING, MEDICAL CARE, AND HEATING?: NOT HARD AT ALL

## 2023-03-31 SDOH — ECONOMIC STABILITY: FOOD INSECURITY: WITHIN THE PAST 12 MONTHS, THE FOOD YOU BOUGHT JUST DIDN'T LAST AND YOU DIDN'T HAVE MONEY TO GET MORE.: NEVER TRUE

## 2023-03-31 SDOH — ECONOMIC STABILITY: HOUSING INSECURITY
IN THE LAST 12 MONTHS, WAS THERE A TIME WHEN YOU DID NOT HAVE A STEADY PLACE TO SLEEP OR SLEPT IN A SHELTER (INCLUDING NOW)?: NO

## 2023-03-31 ASSESSMENT — PATIENT HEALTH QUESTIONNAIRE - PHQ9
SUM OF ALL RESPONSES TO PHQ QUESTIONS 1-9: 0
1. LITTLE INTEREST OR PLEASURE IN DOING THINGS: 0
SUM OF ALL RESPONSES TO PHQ QUESTIONS 1-9: 0
SUM OF ALL RESPONSES TO PHQ QUESTIONS 1-9: 0
SUM OF ALL RESPONSES TO PHQ9 QUESTIONS 1 & 2: 0
2. FEELING DOWN, DEPRESSED OR HOPELESS: 0
SUM OF ALL RESPONSES TO PHQ QUESTIONS 1-9: 0

## 2023-03-31 NOTE — PATIENT INSTRUCTIONS
Covid new covid booster    Call dr Franchesca Hastings for appt    Add lasix  Take your amlodipine    Consider adding claritin if allergies making worse    Prednisone for 10 days

## 2023-03-31 NOTE — PROGRESS NOTES
Jose Juan Granger (:  1950) is a 67 y.o. male, here for evaluation of the following chief complaint(s):    Follow-up        ASSESSMENT/PLAN:  1. Pulmonary emphysema, unspecified emphysema type ([facility]  -     continue Budeson-Glycopyrrol-Formoterol (BREZTRI AEROSPHERE) 160-9-4.8 MCG/ACT AERO; Inhale 2 puffs into the lungs in the morning and at bedtime, Disp-1 each, R-2Normal  (Samples given)  -    continue VENTOLIN  (90 Base) MCG/ACT inhaler; INHALE 2 PUFFS INTO THE LUNGS EVERY 6 HOURS AS NEEDED FOR WHEEZING OR SHORTNESS OF BREATH, Disp-2 each, R-2, DAWNormal  -   patient appears to be in an exacerbation with increased wheezing and dyspnea- RX for  predniSONE (DELTASONE) 20 MG tablet; Take 1 tablet by mouth daily for 10 days, Disp-10 tablet, R-0Normal  Encourage patient to call his pulmonary doctor for an appointment. Told patient to add Claritin as he thinks allergy season is making his symptoms worse. 2. Essential hypertension, benign  Patient has not been compliant with his blood pressure medication. Encouraged him to take his Norvasc. We will also add Lasix at 20 mg daily. 3. Stage 3 chronic kidney disease, unspecified whether stage 3a or 3b CKD ([facility]  Check comprehensive metabolic. Last gfr was 50  4. Anemia, unspecified type  -   check  CBC; Future  5. Hyperlipidemia, unspecified hyperlipidemia type  Check labs on atorvastatin  -     Lipid Panel; Future    Sees Dr. Kenn Medel about leukopenia, weight loss       Return in about 3 months (around 2023). SUBJECTIVE/OBJECTIVE:  HPI  Patient is here with his daughter for routine visit. He seems to be struggling with his breathing more than he usually struggles in moving from the waiting room to the exam room. He denies chest pain but feels very short of breath. This improved as he sat down and rested and his pulse oximeter also showed improvement. His daughter states he is not really compliant with his oral medications.   He states he will try

## 2023-04-03 ENCOUNTER — TELEPHONE (OUTPATIENT)
Dept: INTERNAL MEDICINE CLINIC | Age: 73
End: 2023-04-03

## 2023-04-03 NOTE — TELEPHONE ENCOUNTER
Patient is requesting a prescription for a \"spacer\" be sent to his pharmacy. He states this really helps with his inhaler. Please send prescription to:  [address] [address]   Phone:  [phone]  Fax:  [phone]    Please contact patient with any additional questions.

## 2023-05-02 ENCOUNTER — TELEPHONE (OUTPATIENT)
Dept: CASE MANAGEMENT | Age: 73
End: 2023-05-02

## 2023-05-02 NOTE — TELEPHONE ENCOUNTER
Patient due for annual CT Lung Screening. Reminder letter mailed.       Margaret LNOGORIAN, RN   Lung Nodule Navigator  [facility]  [phone]

## 2023-05-12 ENCOUNTER — TELEPHONE (OUTPATIENT)
Dept: CASE MANAGEMENT | Age: 73
End: 2023-05-12

## 2023-05-12 DIAGNOSIS — Z87.891 HISTORY OF TOBACCO USE: Primary | ICD-10-CM

## 2023-05-15 NOTE — TELEPHONE ENCOUNTER
Order for lung cancer screening CT sign  It would be appreciated if you could reinforce follow-up with me when you contact

## 2023-05-19 ENCOUNTER — TELEPHONE (OUTPATIENT)
Dept: CASE MANAGEMENT | Age: 73
End: 2023-05-19

## 2023-05-19 NOTE — TELEPHONE ENCOUNTER
Pt due for annual Lung Screening CT. Pt has an active order for the test.  Called pt to assist with scheduling. Pt states that it is difficult for him to get around and needs to coordinate a ride to appt with family member. I left him my phone number to contact me after talking with family to try and work out appt timing. Encouraged pt to follow-up with Dr. Skyla Israel as well for appt.       Kristi Hope  Lung Nodule Navigator  [facility]  [phone]

## 2023-05-23 ENCOUNTER — TELEPHONE (OUTPATIENT)
Dept: CASE MANAGEMENT | Age: 73
End: 2023-05-23

## 2023-05-23 NOTE — TELEPHONE ENCOUNTER
Pt contacted navigator. He provided update that he still wishes to complete lung screening and will be discussing with his daughter. Encouraged pt to let me know a couple days that she may be able to provide ride in June and will look at CT schedule to get appointment scheduled. Pt to callback at later date with information.        Cristino BALDWIN, RN   Lung Nodule Navigator  [facility]  [phone]

## 2023-05-26 ENCOUNTER — TELEPHONE (OUTPATIENT)
Dept: CASE MANAGEMENT | Age: 73
End: 2023-05-26

## 2023-05-26 NOTE — TELEPHONE ENCOUNTER
Pts daughter contacted navigator to arrange lung screening appt. Pt scheduled for 6/6/23 at 11:30 at [facility]  Pt verbalized understanding to instructions of date, time, and location and had no further questions. Daughter stated she will contact Dr. Trevino Labor office to schedule follow-up.       Rosina Canseco  Lung Nodule Navigator  [facility].  [phone]

## 2023-06-06 ENCOUNTER — HOSPITAL ENCOUNTER (OUTPATIENT)
Dept: CT IMAGING | Age: 73
Discharge: HOME | End: 2023-06-06
Payer: MEDICARE

## 2023-06-06 DIAGNOSIS — Z87.891 HISTORY OF TOBACCO USE: ICD-10-CM

## 2023-06-06 PROCEDURE — 71271 CT THORAX LUNG CANCER SCR C-: CPT

## 2023-07-03 DIAGNOSIS — E78.5 HYPERLIPIDEMIA, UNSPECIFIED HYPERLIPIDEMIA TYPE: ICD-10-CM

## 2023-07-03 DIAGNOSIS — I10 ESSENTIAL HYPERTENSION, BENIGN: ICD-10-CM

## 2023-07-03 DIAGNOSIS — D64.9 ANEMIA, UNSPECIFIED TYPE: ICD-10-CM

## 2023-07-04 LAB
ALBUMIN SERPL-MCNC: 4.8 G/DL (ref 3.4–5)
ALBUMIN/GLOB SERPL: 1.7 {RATIO} (ref 1.1–2.2)
ALP SERPL-CCNC: 68 U/L (ref 40–129)
ALT SERPL-CCNC: 11 U/L (ref 10–40)
ANION GAP SERPL CALCULATED.3IONS-SCNC: 10 MMOL/L (ref 3–16)
AST SERPL-CCNC: 21 U/L (ref 15–37)
BILIRUB SERPL-MCNC: <0.2 MG/DL (ref 0–1)
BUN SERPL-MCNC: 23 MG/DL (ref 7–20)
CALCIUM SERPL-MCNC: 10.3 MG/DL (ref 8.3–10.6)
CHLORIDE SERPL-SCNC: 101 MMOL/L (ref 99–110)
CHOLEST SERPL-MCNC: 264 MG/DL (ref 0–199)
CO2 SERPL-SCNC: 32 MMOL/L (ref 21–32)
CREAT SERPL-MCNC: 1.6 MG/DL (ref 0.8–1.3)
DEPRECATED RDW RBC AUTO: 16.2 % (ref 12.4–15.4)
GFR SERPLBLD CREATININE-BSD FMLA CKD-EPI: 45 ML/MIN/{1.73_M2}
GLUCOSE SERPL-MCNC: 99 MG/DL (ref 70–99)
HCT VFR BLD AUTO: 38 % (ref 40.5–52.5)
HDLC SERPL-MCNC: 101 MG/DL (ref 40–60)
HGB BLD-MCNC: 12.4 G/DL (ref 13.5–17.5)
LDLC SERPL CALC-MCNC: 145 MG/DL
MCH RBC QN AUTO: 28.9 PG (ref 26–34)
MCHC RBC AUTO-ENTMCNC: 32.6 G/DL (ref 31–36)
MCV RBC AUTO: 88.6 FL (ref 80–100)
PLATELET # BLD AUTO: 197 K/UL (ref 135–450)
PMV BLD AUTO: 8.1 FL (ref 5–10.5)
POTASSIUM SERPL-SCNC: 4.2 MMOL/L (ref 3.5–5.1)
PROT SERPL-MCNC: 7.6 G/DL (ref 6.4–8.2)
RBC # BLD AUTO: 4.29 M/UL (ref 4.2–5.9)
SODIUM SERPL-SCNC: 143 MMOL/L (ref 136–145)
TRIGL SERPL-MCNC: 88 MG/DL (ref 0–150)
VLDLC SERPL CALC-MCNC: 18 MG/DL
WBC # BLD AUTO: 3.8 K/UL (ref 4–11)

## 2023-07-10 ENCOUNTER — OFFICE VISIT (OUTPATIENT)
Dept: INTERNAL MEDICINE CLINIC | Age: 73
End: 2023-07-10

## 2023-07-10 VITALS
BODY MASS INDEX: 21.66 KG/M2 | HEART RATE: 90 BPM | OXYGEN SATURATION: 97 % | WEIGHT: 138 LBS | SYSTOLIC BLOOD PRESSURE: 150 MMHG | TEMPERATURE: 97.7 F | HEIGHT: 67 IN | DIASTOLIC BLOOD PRESSURE: 98 MMHG

## 2023-07-10 DIAGNOSIS — Z12.11 ENCOUNTER FOR SCREENING FOR MALIGNANT NEOPLASM OF COLON: ICD-10-CM

## 2023-07-10 DIAGNOSIS — I10 ESSENTIAL HYPERTENSION, BENIGN: ICD-10-CM

## 2023-07-10 DIAGNOSIS — J43.9 PULMONARY EMPHYSEMA, UNSPECIFIED EMPHYSEMA TYPE (HCC): ICD-10-CM

## 2023-07-10 DIAGNOSIS — Z00.00 MEDICARE ANNUAL WELLNESS VISIT, SUBSEQUENT: Primary | ICD-10-CM

## 2023-07-10 RX ORDER — BUDESONIDE, GLYCOPYRROLATE, AND FORMOTEROL FUMARATE 160; 9; 4.8 UG/1; UG/1; UG/1
2 AEROSOL, METERED RESPIRATORY (INHALATION) 2 TIMES DAILY
Qty: 1 EACH | Refills: 2 | Status: SHIPPED | OUTPATIENT
Start: 2023-07-10 | End: 2023-07-13 | Stop reason: SDUPTHER

## 2023-07-10 RX ORDER — LOSARTAN POTASSIUM 25 MG/1
25 TABLET ORAL DAILY
Qty: 90 TABLET | Refills: 0 | Status: SHIPPED | OUTPATIENT
Start: 2023-07-10

## 2023-07-10 RX ORDER — LOSARTAN POTASSIUM 25 MG/1
25 TABLET ORAL DAILY
Qty: 30 TABLET | Refills: 5 | Status: SHIPPED | OUTPATIENT
Start: 2023-07-10 | End: 2023-07-10

## 2023-07-10 ASSESSMENT — PATIENT HEALTH QUESTIONNAIRE - PHQ9
SUM OF ALL RESPONSES TO PHQ QUESTIONS 1-9: 0
SUM OF ALL RESPONSES TO PHQ9 QUESTIONS 1 & 2: 0
1. LITTLE INTEREST OR PLEASURE IN DOING THINGS: 0
SUM OF ALL RESPONSES TO PHQ QUESTIONS 1-9: 0
SUM OF ALL RESPONSES TO PHQ QUESTIONS 1-9: 0
2. FEELING DOWN, DEPRESSED OR HOPELESS: 0
SUM OF ALL RESPONSES TO PHQ QUESTIONS 1-9: 0

## 2023-07-10 ASSESSMENT — LIFESTYLE VARIABLES
HOW MANY STANDARD DRINKS CONTAINING ALCOHOL DO YOU HAVE ON A TYPICAL DAY: PATIENT DOES NOT DRINK
HOW OFTEN DO YOU HAVE A DRINK CONTAINING ALCOHOL: NEVER

## 2023-07-10 NOTE — PROGRESS NOTES
600 MG extended release tablet Take 1 tablet by mouth daily Yes Historical Provider, MD   Respiratory Therapy Supplies (NEBULIZER) LILIANE 1 applicator by Does not apply route 4 times daily as needed (SOB/wheezing) Yes Jerry Art, DO   Multiple Vitamins-Minerals (THERAPEUTIC MULTIVITAMIN-MINERALS) tablet Take 1 tablet by mouth daily Yes Historical Provider, MD   losartan (COZAAR) 25 MG tablet TAKE 1 TABLET BY MOUTH DAILY  Preston Daley MD       [facility] (Including outside providers/suppliers regularly involved in providing care):   Patient Care Team:  Preston Daley MD as PCP - General (Internal Medicine)  Preston Daley MD as PCP - Empaneled Provider  Thaddeus Gonzales MD as Consulting Physician (Pulmonology)     Reviewed and updated this visit:  Tobacco  Allergies  Meds  Problems  Med Hx  Surg Hx  Soc Hx  Fam Hx

## 2023-07-10 NOTE — TELEPHONE ENCOUNTER
Last appointment: 7/10/2023  Next appointment: 10/16/2023  Last refill: 90 day supply requested instead of 30 sent today

## 2023-07-13 ENCOUNTER — OFFICE VISIT (OUTPATIENT)
Dept: PULMONOLOGY | Age: 73
End: 2023-07-13
Payer: MEDICARE

## 2023-07-13 VITALS
HEART RATE: 62 BPM | DIASTOLIC BLOOD PRESSURE: 96 MMHG | WEIGHT: 138 LBS | OXYGEN SATURATION: 94 % | HEIGHT: 67 IN | SYSTOLIC BLOOD PRESSURE: 142 MMHG | BODY MASS INDEX: 21.66 KG/M2

## 2023-07-13 DIAGNOSIS — J96.11 CHRONIC RESPIRATORY FAILURE WITH HYPOXIA (HCC): ICD-10-CM

## 2023-07-13 DIAGNOSIS — R06.09 DOE (DYSPNEA ON EXERTION): ICD-10-CM

## 2023-07-13 DIAGNOSIS — Z87.891 HISTORY OF TOBACCO USE: ICD-10-CM

## 2023-07-13 DIAGNOSIS — J44.9 COPD, VERY SEVERE (HCC): Primary | ICD-10-CM

## 2023-07-13 PROCEDURE — 3077F SYST BP >= 140 MM HG: CPT | Performed by: INTERNAL MEDICINE

## 2023-07-13 PROCEDURE — 3017F COLORECTAL CA SCREEN DOC REV: CPT | Performed by: INTERNAL MEDICINE

## 2023-07-13 PROCEDURE — G8428 CUR MEDS NOT DOCUMENT: HCPCS | Performed by: INTERNAL MEDICINE

## 2023-07-13 PROCEDURE — 3023F SPIROM DOC REV: CPT | Performed by: INTERNAL MEDICINE

## 2023-07-13 PROCEDURE — 1123F ACP DISCUSS/DSCN MKR DOCD: CPT | Performed by: INTERNAL MEDICINE

## 2023-07-13 PROCEDURE — 1036F TOBACCO NON-USER: CPT | Performed by: INTERNAL MEDICINE

## 2023-07-13 PROCEDURE — G8420 CALC BMI NORM PARAMETERS: HCPCS | Performed by: INTERNAL MEDICINE

## 2023-07-13 PROCEDURE — 99214 OFFICE O/P EST MOD 30 MIN: CPT | Performed by: INTERNAL MEDICINE

## 2023-07-13 PROCEDURE — 3080F DIAST BP >= 90 MM HG: CPT | Performed by: INTERNAL MEDICINE

## 2023-07-13 RX ORDER — ALBUTEROL SULFATE 90 UG/1
2 AEROSOL, METERED RESPIRATORY (INHALATION) EVERY 4 HOURS PRN
Qty: 2 EACH | Refills: 11 | Status: SHIPPED | OUTPATIENT
Start: 2023-07-13

## 2023-07-13 RX ORDER — BUDESONIDE, GLYCOPYRROLATE, AND FORMOTEROL FUMARATE 160; 9; 4.8 UG/1; UG/1; UG/1
2 AEROSOL, METERED RESPIRATORY (INHALATION) 2 TIMES DAILY
Qty: 1 EACH | Refills: 11 | Status: SHIPPED | OUTPATIENT
Start: 2023-07-13

## 2023-07-13 NOTE — PROGRESS NOTES
[facility] Pulmonary and Critical Care    Outpatient Follow Up Note    Subjective:   CHIEF COMPLAINT / HPI:     The patient is 67 y.o. male who is here for follow-up of very severe COPD and chronic hypoxemic respiratory failure. He continues to have significant dyspnea on exertion with minimal activities. He states that it is difficult for him to leave the house and needs extra time given his difficulties with breathing. He remains on continuous oxygen at 2 L. He is compliant with Breztri and needs a refill. He has never been through pulmonary rehab and he states that he is not exercising on his home stationary bike as much. He still is undecided about [place] endobronchial valves. 10/21/2022  Nikko Garcia is here for follow-up of very severe COPD complicated with chronic hypoxemic respiratory failure. Since last visit I ordered a CT chest both for lung cancer screening and evaluation for [place] endobronchial valves. Right upper lobe does appear to be a viable option for endobronchial valves as fissure integrity between it and right middle lobe and right lower lobe is 100% and voxel density is 71. Noe Amaya continues to have significant dyspnea on exertion with everyday activities that is interfering with his quality of life. It is at his baseline. He continues breast tree 2 puffs twice daily and uses his albuterol multiple times a day. He remains on continuous oxygen on 3 L with his POC    4/5/2022  Nikko Garcia is here for follow-up of very severe COPD complicated with chronic hypoxemic respiratory failure. Last visit I referred him to pulmonary rehab. It sounds like initially went but then decided it was not for him. He does use an exercise bike and rides it for about 3 minutes. His chronic dyspnea on exertion is unchanged and does affect his quality of life. He has no cough, wheezing, chest tightness, hemoptysis, sputum production, or peripheral edema.   Since last visit he has been trialed on

## 2023-07-15 DIAGNOSIS — N18.30 STAGE 3 CHRONIC KIDNEY DISEASE, UNSPECIFIED WHETHER STAGE 3A OR 3B CKD (HCC): Primary | ICD-10-CM

## 2023-09-05 ENCOUNTER — HOSPITAL ENCOUNTER (OUTPATIENT)
Dept: ULTRASOUND IMAGING | Age: 73
Discharge: HOME | End: 2023-09-05
Payer: MEDICARE

## 2023-09-05 DIAGNOSIS — N18.30 STAGE 3 CHRONIC KIDNEY DISEASE, UNSPECIFIED WHETHER STAGE 3A OR 3B CKD (HCC): ICD-10-CM

## 2023-09-05 PROCEDURE — 76770 US EXAM ABDO BACK WALL COMP: CPT

## 2023-10-16 ENCOUNTER — TELEPHONE (OUTPATIENT)
Dept: INTERNAL MEDICINE CLINIC | Age: 73
End: 2023-10-16

## 2023-10-16 ENCOUNTER — TELEMEDICINE (OUTPATIENT)
Dept: INTERNAL MEDICINE CLINIC | Age: 73
End: 2023-10-16

## 2023-10-16 DIAGNOSIS — I10 ESSENTIAL HYPERTENSION, BENIGN: ICD-10-CM

## 2023-10-16 DIAGNOSIS — J43.9 PULMONARY EMPHYSEMA, UNSPECIFIED EMPHYSEMA TYPE (HCC): Primary | ICD-10-CM

## 2023-10-16 RX ORDER — ALBUTEROL SULFATE 90 UG/1
2 AEROSOL, METERED RESPIRATORY (INHALATION) EVERY 4 HOURS PRN
Qty: 2 EACH | Refills: 11 | Status: SHIPPED | OUTPATIENT
Start: 2023-10-16

## 2023-10-16 RX ORDER — LOSARTAN POTASSIUM 100 MG/1
100 TABLET ORAL DAILY
Qty: 90 TABLET | Refills: 1 | Status: SHIPPED | OUTPATIENT
Start: 2023-10-16 | End: 2024-04-13

## 2023-10-16 RX ORDER — FUROSEMIDE 20 MG/1
20 TABLET ORAL DAILY
Qty: 90 TABLET | Refills: 1 | Status: SHIPPED | OUTPATIENT
Start: 2023-10-16 | End: 2024-04-13

## 2023-10-16 RX ORDER — FLUTICASONE PROPIONATE 50 MCG
SPRAY, SUSPENSION (ML) NASAL
Qty: 3 EACH | Refills: 3 | Status: SHIPPED | OUTPATIENT
Start: 2023-10-16

## 2023-10-16 RX ORDER — BUDESONIDE, GLYCOPYRROLATE, AND FORMOTEROL FUMARATE 160; 9; 4.8 UG/1; UG/1; UG/1
2 AEROSOL, METERED RESPIRATORY (INHALATION) 2 TIMES DAILY
Qty: 1 EACH | Refills: 11 | Status: SHIPPED | OUTPATIENT
Start: 2023-10-16 | End: 2023-10-18

## 2023-10-16 NOTE — PATIENT INSTRUCTIONS
Shots:   Flu here or pharmacy, covid and RSV shots only at pharmacy    Increase to losartan 100 mg daily

## 2023-10-16 NOTE — PROGRESS NOTES
in the morning and at bedtime 1 each 11    VENTOLIN  (90 Base) MCG/ACT inhaler Inhale 2 puffs into the lungs every 4 hours as needed for Wheezing INHALE 2 PUFFS INTO THE LUNGS EVERY 6 HOURS AS NEEDED FOR WHEEZING OR SHORTNESS OF BREATH 2 each 11    furosemide (LASIX) 20 MG tablet Take 1 tablet by mouth daily 90 tablet 1    fluticasone (FLONASE) 50 MCG/ACT nasal spray SHAKE LIQUID AND USE 2 SPRAYS IN EACH NOSTRIL DAILY 3 each 3    Spacer/Aero-Holding Chambers LILIANE 1 Device by Does not apply route daily as needed (shortness of breath) 1 each 0    Spacer/Aero-Holding Chambers LILIANE 1 Device by Does not apply route daily 1 each 0    atorvastatin (LIPITOR) 40 MG tablet TAKE 1 TABLET BY MOUTH EVERY NIGHT (Patient taking differently: 2-3x per week) 90 tablet 1    Handicap Placard MISC by Does not apply route Duration: 5 years 1 each 0    guaiFENesin (MUCINEX) 600 MG extended release tablet Take 1 tablet by mouth daily      Respiratory Therapy Supplies (NEBULIZER) LILIANE 1 applicator by Does not apply route 4 times daily as needed (SOB/wheezing) 1 Device 0    Multiple Vitamins-Minerals (THERAPEUTIC MULTIVITAMIN-MINERALS) tablet Take 1 tablet by mouth daily       No current facility-administered medications for this visit. Constitutional: [x] Appears well-developed and well-nourished [x] No apparent distress  with oxygen by nasal cannula      Mental status  [x] Alert and awake  [x] Oriented to person/place/time [x]Able to follow commands      Eyes:  EOM    [x]  Normal   Sclera  [x]  Normal     HENT:   [x] Normocephalic, atraumatic.     Mouth/Throat: Mucous membranes are moist.     External Ears [x] Normal      Neck: [x] No visualized mass     Pulmonary/Chest: [] Respiratory effort normal.  [x] No visualized signs of difficulty breathing or respiratory distress           Musculoskeletal:   [] Normal gait with no signs of ataxia         [x] Normal range of motion of neck          Neurological:        [x] No Facial

## 2023-10-16 NOTE — TELEPHONE ENCOUNTER
I don't see any samples. Can drug rep be called and/or he should try his Pulmonary MD or perhaps reach out to [facility] to see if there is a drug company sponsored patient assistance plan.

## 2023-10-16 NOTE — TELEPHONE ENCOUNTER
Call center message:    Patient forgot to ask at [facility] this morning for sample of breztri inhalers. He is going to be out by the end of the week, he's is currently in the gap, and medication is expensive. He wanted to know if a sample could be given until he is able to refill   med. Please advise.

## 2023-10-17 NOTE — TELEPHONE ENCOUNTER
Please inform pt of information below. For now he should call Pulmonary or may have to pay this month.

## 2023-10-18 NOTE — TELEPHONE ENCOUNTER
Spoke with Sarahi Barry and she is coming to  samples (gave her 2 boxes of breztri).  Can you order the sample please and thank you

## 2023-11-09 ENCOUNTER — TELEPHONE (OUTPATIENT)
Dept: INTERNAL MEDICINE CLINIC | Age: 73
End: 2023-11-09

## 2023-11-09 ENCOUNTER — TELEPHONE (OUTPATIENT)
Dept: PULMONOLOGY | Age: 73
End: 2023-11-09

## 2023-11-09 DIAGNOSIS — R06.09 DOE (DYSPNEA ON EXERTION): ICD-10-CM

## 2023-11-09 DIAGNOSIS — J96.11 CHRONIC RESPIRATORY FAILURE WITH HYPOXIA (HCC): ICD-10-CM

## 2023-11-09 DIAGNOSIS — J44.9 COPD, VERY SEVERE (HCC): Primary | ICD-10-CM

## 2023-11-09 NOTE — TELEPHONE ENCOUNTER
[facility] calls asking if Dr. Luli Chang would send an order for hospice to them. The representative, Geri Watson, is reaching this ofice, on behalf of family and patient, because his DX is COPD and the order for O2 comes from this office.

## 2023-11-09 NOTE — TELEPHONE ENCOUNTER
Pamela with [facility] is calling to request an order for a Hospice Evaluation as well as the office notes for 10/16/23.   Please fax to: [phone]    Please contact Pamela at the number provided with questions/concerns

## 2023-11-09 NOTE — TELEPHONE ENCOUNTER
Last Office note faxed to [address] [facility] as requested. Hospice order has been placed by Dr. Noel Shah @ [address] [facility].   See Pulmonology Encounter

## 2023-11-10 ENCOUNTER — TELEPHONE (OUTPATIENT)
Dept: PULMONOLOGY | Age: 73
End: 2023-11-10

## 2023-11-10 NOTE — TELEPHONE ENCOUNTER
Albino @ [facility] has requested last office notes, med list, and labs be faxed to her - [phone].    28 pages were faxed.